# Patient Record
Sex: FEMALE | Race: WHITE | NOT HISPANIC OR LATINO | URBAN - METROPOLITAN AREA
[De-identification: names, ages, dates, MRNs, and addresses within clinical notes are randomized per-mention and may not be internally consistent; named-entity substitution may affect disease eponyms.]

---

## 2017-12-20 ENCOUNTER — EMERGENCY (EMERGENCY)
Facility: HOSPITAL | Age: 67
LOS: 1 days | Discharge: HOME | End: 2017-12-20
Attending: EMERGENCY MEDICINE | Admitting: EMERGENCY MEDICINE
Payer: MEDICARE

## 2017-12-20 VITALS
SYSTOLIC BLOOD PRESSURE: 144 MMHG | DIASTOLIC BLOOD PRESSURE: 79 MMHG | HEIGHT: 63 IN | HEART RATE: 78 BPM | OXYGEN SATURATION: 100 % | WEIGHT: 110.01 LBS | RESPIRATION RATE: 15 BRPM | TEMPERATURE: 98 F

## 2017-12-20 VITALS
RESPIRATION RATE: 14 BRPM | DIASTOLIC BLOOD PRESSURE: 74 MMHG | SYSTOLIC BLOOD PRESSURE: 136 MMHG | HEART RATE: 72 BPM | TEMPERATURE: 99 F | OXYGEN SATURATION: 99 %

## 2017-12-20 DIAGNOSIS — Z90.89 ACQUIRED ABSENCE OF OTHER ORGANS: Chronic | ICD-10-CM

## 2017-12-20 PROCEDURE — 73030 X-RAY EXAM OF SHOULDER: CPT

## 2017-12-20 PROCEDURE — 70450 CT HEAD/BRAIN W/O DYE: CPT | Mod: 26

## 2017-12-20 PROCEDURE — 72125 CT NECK SPINE W/O DYE: CPT | Mod: 26

## 2017-12-20 PROCEDURE — 71101 X-RAY EXAM UNILAT RIBS/CHEST: CPT

## 2017-12-20 PROCEDURE — 71101 X-RAY EXAM UNILAT RIBS/CHEST: CPT | Mod: 26

## 2017-12-20 PROCEDURE — 99284 EMERGENCY DEPT VISIT MOD MDM: CPT

## 2017-12-20 PROCEDURE — 99284 EMERGENCY DEPT VISIT MOD MDM: CPT | Mod: 25

## 2017-12-20 PROCEDURE — 73030 X-RAY EXAM OF SHOULDER: CPT | Mod: 26,LT

## 2017-12-20 PROCEDURE — 70450 CT HEAD/BRAIN W/O DYE: CPT

## 2017-12-20 PROCEDURE — 72125 CT NECK SPINE W/O DYE: CPT

## 2017-12-20 RX ORDER — OXYCODONE AND ACETAMINOPHEN 5; 325 MG/1; MG/1
1 TABLET ORAL ONCE
Qty: 0 | Refills: 0 | Status: DISCONTINUED | OUTPATIENT
Start: 2017-12-20 | End: 2017-12-20

## 2017-12-20 RX ADMIN — OXYCODONE AND ACETAMINOPHEN 1 TABLET(S): 5; 325 TABLET ORAL at 12:54

## 2017-12-20 RX ADMIN — OXYCODONE AND ACETAMINOPHEN 1 TABLET(S): 5; 325 TABLET ORAL at 14:26

## 2017-12-20 NOTE — ED PROVIDER NOTE - ATTENDING CONTRIBUTION TO CARE
Pt is a 68 yo female who presents to the ED with a cc of left sided pain s/p fall.  PMHx of anxiety, HTN, IBS, migraine HA.  Pt reports that she suffered a mechanical fall yesterday evening.  She was making food for her son when she slipped on some Crisco that was on the floor and landed on her left side.  Pt denies striking her head and reports no LOC.  Pt is not on blood thinners.  She did have a hard time getting off the ground but was eventually able to.  She was ambulatory after the incident.  Denies HA, blurry vision, V/D/C, abd pain.  She reports nausea, pain under her left breast with movement, left shoulder pain, and SOB.  Denies neck pain.  Pt is right hand dominant.  On exam pt anxious lying in bed.  NCAT, PERRL, EOMI, heart RRR, lungs CTA bilaterally, abd soft NT/ND.  Diffuse left lateral chest wall TTP with no acute deformity noted, Ecchymosis noted to distal left clavicle with TTP sensation intact to left UE with no TTP to humerus, elbow, forearm, wrist or hand at this time.  +radial pulse cap refill less then 2 seconds.  TMs clear no septal hematoma.  No midline C/T/L TTP, step offs or deformities noted.  Will obtain x-ray of left ribs, shoulder, and CT head/cervical spine.  Will control pain.  Agree with above plan of care.

## 2017-12-20 NOTE — ED PROVIDER NOTE - OBJECTIVE STATEMENT
66 yo female presents with accompanied to ed by her mother,  s/p slip and fall in her kitchen last night.  states was cooking and slipped on crisco that was on the floor, states she was cooking pancakes for her autistic son.   denies head injury, states she thinks she fell on her left side, has a bruise on her left shoulder, left rib pain with palpation and left neck pain.  not on blood thinners.  PMD Dr Tovar

## 2017-12-20 NOTE — ED PROVIDER NOTE - PROGRESS NOTE DETAILS
patient resting comfortably, discussed left distal clavicle fracture, states she has an orthopedic Dr Cruz.  sling applied, nvi.  copy of xrays given, ct head negative, ct cervical spine degenerative changes.  rx for percocet sent to pharmacy.  call out to ortho resident awaiting call back spoke with ortho resident, case discussed, viewed xrays, advised sling, patient can follow up with Dr Madden, call office to schedule appt time

## 2017-12-20 NOTE — ED PROVIDER NOTE - CARE PLAN
Principal Discharge DX:	Clavicle fracture Principal Discharge DX:	Clavicle fracture  Secondary Diagnosis:	Fall, initial encounter

## 2020-09-26 ENCOUNTER — EMERGENCY (EMERGENCY)
Facility: HOSPITAL | Age: 70
LOS: 1 days | Discharge: HOME | End: 2020-09-26
Attending: EMERGENCY MEDICINE | Admitting: EMERGENCY MEDICINE
Payer: MEDICARE

## 2020-09-26 VITALS
HEART RATE: 88 BPM | WEIGHT: 110.01 LBS | OXYGEN SATURATION: 96 % | HEIGHT: 63 IN | DIASTOLIC BLOOD PRESSURE: 78 MMHG | SYSTOLIC BLOOD PRESSURE: 145 MMHG | RESPIRATION RATE: 18 BRPM | TEMPERATURE: 98 F

## 2020-09-26 VITALS
DIASTOLIC BLOOD PRESSURE: 64 MMHG | TEMPERATURE: 98 F | HEART RATE: 71 BPM | OXYGEN SATURATION: 96 % | SYSTOLIC BLOOD PRESSURE: 132 MMHG | RESPIRATION RATE: 16 BRPM

## 2020-09-26 DIAGNOSIS — Z90.89 ACQUIRED ABSENCE OF OTHER ORGANS: Chronic | ICD-10-CM

## 2020-09-26 LAB
ALBUMIN SERPL ELPH-MCNC: 4 G/DL — SIGNIFICANT CHANGE UP (ref 3.3–5)
ALP SERPL-CCNC: 67 U/L — SIGNIFICANT CHANGE UP (ref 40–120)
ALT FLD-CCNC: 13 U/L — SIGNIFICANT CHANGE UP (ref 12–78)
ANION GAP SERPL CALC-SCNC: 7 MMOL/L — SIGNIFICANT CHANGE UP (ref 5–17)
AST SERPL-CCNC: 15 U/L — SIGNIFICANT CHANGE UP (ref 15–37)
BASOPHILS # BLD AUTO: 0.1 K/UL — SIGNIFICANT CHANGE UP (ref 0–0.2)
BASOPHILS # BLD AUTO: 0.16 K/UL — SIGNIFICANT CHANGE UP (ref 0–0.2)
BASOPHILS NFR BLD AUTO: 0.6 % — SIGNIFICANT CHANGE UP (ref 0–2)
BASOPHILS NFR BLD AUTO: 0.8 % — SIGNIFICANT CHANGE UP (ref 0–2)
BILIRUB SERPL-MCNC: 0.3 MG/DL — SIGNIFICANT CHANGE UP (ref 0.2–1.2)
BUN SERPL-MCNC: 15 MG/DL — SIGNIFICANT CHANGE UP (ref 7–23)
CALCIUM SERPL-MCNC: 9.1 MG/DL — SIGNIFICANT CHANGE UP (ref 8.5–10.1)
CHLORIDE SERPL-SCNC: 105 MMOL/L — SIGNIFICANT CHANGE UP (ref 96–108)
CO2 SERPL-SCNC: 29 MMOL/L — SIGNIFICANT CHANGE UP (ref 22–31)
CREAT SERPL-MCNC: 0.96 MG/DL — SIGNIFICANT CHANGE UP (ref 0.5–1.3)
EOSINOPHIL # BLD AUTO: 0.03 K/UL — SIGNIFICANT CHANGE UP (ref 0–0.5)
EOSINOPHIL # BLD AUTO: 0.1 K/UL — SIGNIFICANT CHANGE UP (ref 0–0.5)
EOSINOPHIL NFR BLD AUTO: 0.2 % — SIGNIFICANT CHANGE UP (ref 0–6)
EOSINOPHIL NFR BLD AUTO: 0.5 % — SIGNIFICANT CHANGE UP (ref 0–6)
ETHANOL SERPL-MCNC: <10 MG/DL — SIGNIFICANT CHANGE UP (ref 0–10)
GLUCOSE SERPL-MCNC: 132 MG/DL — HIGH (ref 70–99)
HCT VFR BLD CALC: 28.1 % — LOW (ref 34.5–45)
HCT VFR BLD CALC: 33 % — LOW (ref 34.5–45)
HGB BLD-MCNC: 11.3 G/DL — LOW (ref 11.5–15.5)
HGB BLD-MCNC: 9.5 G/DL — LOW (ref 11.5–15.5)
IMM GRANULOCYTES NFR BLD AUTO: 0.6 % — SIGNIFICANT CHANGE UP (ref 0–1.5)
IMM GRANULOCYTES NFR BLD AUTO: 0.6 % — SIGNIFICANT CHANGE UP (ref 0–1.5)
LYMPHOCYTES # BLD AUTO: 1.36 K/UL — SIGNIFICANT CHANGE UP (ref 1–3.3)
LYMPHOCYTES # BLD AUTO: 12.5 % — LOW (ref 13–44)
LYMPHOCYTES # BLD AUTO: 2.55 K/UL — SIGNIFICANT CHANGE UP (ref 1–3.3)
LYMPHOCYTES # BLD AUTO: 7.8 % — LOW (ref 13–44)
MAGNESIUM SERPL-MCNC: 2.4 MG/DL — SIGNIFICANT CHANGE UP (ref 1.6–2.6)
MCHC RBC-ENTMCNC: 30.9 PG — SIGNIFICANT CHANGE UP (ref 27–34)
MCHC RBC-ENTMCNC: 30.9 PG — SIGNIFICANT CHANGE UP (ref 27–34)
MCHC RBC-ENTMCNC: 33.8 GM/DL — SIGNIFICANT CHANGE UP (ref 32–36)
MCHC RBC-ENTMCNC: 34.2 GM/DL — SIGNIFICANT CHANGE UP (ref 32–36)
MCV RBC AUTO: 90.2 FL — SIGNIFICANT CHANGE UP (ref 80–100)
MCV RBC AUTO: 91.5 FL — SIGNIFICANT CHANGE UP (ref 80–100)
MONOCYTES # BLD AUTO: 1.18 K/UL — HIGH (ref 0–0.9)
MONOCYTES # BLD AUTO: 1.33 K/UL — HIGH (ref 0–0.9)
MONOCYTES NFR BLD AUTO: 5.8 % — SIGNIFICANT CHANGE UP (ref 2–14)
MONOCYTES NFR BLD AUTO: 7.6 % — SIGNIFICANT CHANGE UP (ref 2–14)
NEUTROPHILS # BLD AUTO: 14.51 K/UL — HIGH (ref 1.8–7.4)
NEUTROPHILS # BLD AUTO: 16.32 K/UL — HIGH (ref 1.8–7.4)
NEUTROPHILS NFR BLD AUTO: 79.8 % — HIGH (ref 43–77)
NEUTROPHILS NFR BLD AUTO: 83.2 % — HIGH (ref 43–77)
NRBC # BLD: 0 /100 WBCS — SIGNIFICANT CHANGE UP (ref 0–0)
NRBC # BLD: 0 /100 WBCS — SIGNIFICANT CHANGE UP (ref 0–0)
PLATELET # BLD AUTO: 271 K/UL — SIGNIFICANT CHANGE UP (ref 150–400)
PLATELET # BLD AUTO: 345 K/UL — SIGNIFICANT CHANGE UP (ref 150–400)
POTASSIUM SERPL-MCNC: 3.6 MMOL/L — SIGNIFICANT CHANGE UP (ref 3.5–5.3)
POTASSIUM SERPL-SCNC: 3.6 MMOL/L — SIGNIFICANT CHANGE UP (ref 3.5–5.3)
PROT SERPL-MCNC: 7.1 G/DL — SIGNIFICANT CHANGE UP (ref 6–8.3)
RBC # BLD: 3.07 M/UL — LOW (ref 3.8–5.2)
RBC # BLD: 3.66 M/UL — LOW (ref 3.8–5.2)
RBC # FLD: 12.8 % — SIGNIFICANT CHANGE UP (ref 10.3–14.5)
RBC # FLD: 13 % — SIGNIFICANT CHANGE UP (ref 10.3–14.5)
SODIUM SERPL-SCNC: 141 MMOL/L — SIGNIFICANT CHANGE UP (ref 135–145)
TROPONIN I SERPL-MCNC: 0.03 NG/ML — SIGNIFICANT CHANGE UP (ref 0.01–0.04)
WBC # BLD: 17.44 K/UL — HIGH (ref 3.8–10.5)
WBC # BLD: 20.44 K/UL — HIGH (ref 3.8–10.5)
WBC # FLD AUTO: 17.44 K/UL — HIGH (ref 3.8–10.5)
WBC # FLD AUTO: 20.44 K/UL — HIGH (ref 3.8–10.5)

## 2020-09-26 PROCEDURE — 70450 CT HEAD/BRAIN W/O DYE: CPT

## 2020-09-26 PROCEDURE — 93010 ELECTROCARDIOGRAM REPORT: CPT

## 2020-09-26 PROCEDURE — 82962 GLUCOSE BLOOD TEST: CPT

## 2020-09-26 PROCEDURE — 84484 ASSAY OF TROPONIN QUANT: CPT

## 2020-09-26 PROCEDURE — 73030 X-RAY EXAM OF SHOULDER: CPT | Mod: 26,RT

## 2020-09-26 PROCEDURE — 73030 X-RAY EXAM OF SHOULDER: CPT

## 2020-09-26 PROCEDURE — 99284 EMERGENCY DEPT VISIT MOD MDM: CPT | Mod: 25

## 2020-09-26 PROCEDURE — 72125 CT NECK SPINE W/O DYE: CPT

## 2020-09-26 PROCEDURE — 70450 CT HEAD/BRAIN W/O DYE: CPT | Mod: 26

## 2020-09-26 PROCEDURE — 36415 COLL VENOUS BLD VENIPUNCTURE: CPT

## 2020-09-26 PROCEDURE — 85025 COMPLETE CBC W/AUTO DIFF WBC: CPT

## 2020-09-26 PROCEDURE — 71045 X-RAY EXAM CHEST 1 VIEW: CPT | Mod: 26

## 2020-09-26 PROCEDURE — 73080 X-RAY EXAM OF ELBOW: CPT | Mod: 26,RT

## 2020-09-26 PROCEDURE — 93005 ELECTROCARDIOGRAM TRACING: CPT

## 2020-09-26 PROCEDURE — 80053 COMPREHEN METABOLIC PANEL: CPT

## 2020-09-26 PROCEDURE — 72125 CT NECK SPINE W/O DYE: CPT | Mod: 26

## 2020-09-26 PROCEDURE — 71045 X-RAY EXAM CHEST 1 VIEW: CPT

## 2020-09-26 PROCEDURE — 83735 ASSAY OF MAGNESIUM: CPT

## 2020-09-26 PROCEDURE — 73080 X-RAY EXAM OF ELBOW: CPT

## 2020-09-26 PROCEDURE — 80307 DRUG TEST PRSMV CHEM ANLYZR: CPT

## 2020-09-26 PROCEDURE — 12001 RPR S/N/AX/GEN/TRNK 2.5CM/<: CPT

## 2020-09-26 PROCEDURE — 99285 EMERGENCY DEPT VISIT HI MDM: CPT

## 2020-09-26 RX ORDER — SODIUM CHLORIDE 9 MG/ML
1000 INJECTION INTRAMUSCULAR; INTRAVENOUS; SUBCUTANEOUS ONCE
Refills: 0 | Status: DISCONTINUED | OUTPATIENT
Start: 2020-09-26 | End: 2020-09-26

## 2020-09-26 RX ORDER — OXYCODONE AND ACETAMINOPHEN 5; 325 MG/1; MG/1
2 TABLET ORAL ONCE
Refills: 0 | Status: DISCONTINUED | OUTPATIENT
Start: 2020-09-26 | End: 2020-09-26

## 2020-09-26 RX ORDER — IBUPROFEN 200 MG
600 TABLET ORAL ONCE
Refills: 0 | Status: COMPLETED | OUTPATIENT
Start: 2020-09-26 | End: 2020-09-26

## 2020-09-26 RX ORDER — LIDOCAINE 4 G/100G
1 CREAM TOPICAL ONCE
Refills: 0 | Status: COMPLETED | OUTPATIENT
Start: 2020-09-26 | End: 2020-09-26

## 2020-09-26 RX ADMIN — LIDOCAINE 1 PATCH: 4 CREAM TOPICAL at 22:30

## 2020-09-26 RX ADMIN — Medication 600 MILLIGRAM(S): at 22:30

## 2020-09-26 RX ADMIN — OXYCODONE AND ACETAMINOPHEN 2 TABLET(S): 5; 325 TABLET ORAL at 16:30

## 2020-09-26 RX ADMIN — Medication 600 MILLIGRAM(S): at 23:00

## 2020-09-26 RX ADMIN — OXYCODONE AND ACETAMINOPHEN 2 TABLET(S): 5; 325 TABLET ORAL at 17:00

## 2020-09-26 NOTE — ED PROVIDER NOTE - PROVIDER TOKENS
PROVIDER:[TOKEN:[[id number]],FOLLOWUP:[1-3 Days]] PROVIDER:[TOKEN:[[id number]],FOLLOWUP:[1-3 Days]],PROVIDER:[TOKEN:[[id number]],FOLLOWUP:[1-3 Days]]

## 2020-09-26 NOTE — ED PROVIDER NOTE - PROGRESS NOTE DETAILS
Reevaluated patient at bedside.  Patient feeling much improved.  lac repaired, wound care discussed. shoulder immobilizer applied. n/v intact. will follow up with ortho. referral provided. Discussed the results of all diagnostic testing in ED and copies of all reports given.   An opportunity to ask questions was given.  Discussed the importance of prompt, close medical follow-up.  Patient will return with any changes, concerns or persistent / worsening symptoms.  Understanding of all instructions verbalized. pt went to bathroom after set for d/c and felt dizzy, pt pulled alarm cord and tech went in bathroom and pt with breif syncope, held up on toilet by tech, no trauma. pt had preceding pain in shoulder and percoccet, but will get labs, ivf, cards consult, ekg pt cleared by cards from syncope standpoint, wbc 20k , likely due to fx, will recheck after ivf, pt feels better resting comfortably, eating and drinking. white count elevated on blood work. uncertain etiology. suspect possibly stress related. do not suspect infection. will hydrate and recheck. IVF infusing. repeat CBC pending. Dr. Dillon will assume care pt wbc 17 after ivf, presume from fx, ambulating without dizziness, ok for d/c and outpt fu

## 2020-09-26 NOTE — ED PROVIDER NOTE - CARE PLAN
Principal Discharge DX:	Closed fracture of head of right humerus, initial encounter  Secondary Diagnosis:	Forehead laceration, initial encounter  Secondary Diagnosis:	Fall, initial encounter   Principal Discharge DX:	Closed fracture of head of right humerus, initial encounter  Secondary Diagnosis:	Forehead laceration, initial encounter  Secondary Diagnosis:	Fall, initial encounter  Secondary Diagnosis:	Syncope, unspecified syncope type

## 2020-09-26 NOTE — ED ADULT NURSE REASSESSMENT NOTE - NS ED NURSE REASSESS COMMENT FT1
Pt ambulated to bathroom with assistance - when in bathroom pt pulled call bell stating she felt like she was going to pass out - pt was pale diaphoretic - sitting on commode - pt was taken back to room via wheelchair - placed in stretcher - bloods were drawn and IV started -pt feeling better laying down - cards at bedside -

## 2020-09-26 NOTE — ED PROVIDER NOTE - ATTENDING CONTRIBUTION TO CARE
Pt seen and examined and d/w PA.  agree with a and p.  pt is a 69 yo female on no blood thinners sp trip and fall landing on right shoulder and foehead,  pt denies loc, neck pain, n/v, numbness, weakness, but co 8/10 right shoulder pain and forehead laceration. pt on exam in pain, c spine nt, t and l spine t, right shoulder ttp, rom limited by pain, no palpable dislocation, chest nt, sm intact, no other bony ttp, 2+ pulses, chest and abd nt, lungs cta, forehead with abrasion/laceration requiring cleaning and eval to see if sutures required, ? tdap states, xray shoulder, ct head and neck, pain meds, if laceration, will clean and suture.  xray with humeral head fx, sling. Pt seen and examined and d/w PA.  agree with a and p.  pt is a 69 yo female on no blood thinners sp trip and fall landing on right shoulder and foehead,  pt denies loc, neck pain, n/v, numbness, weakness, but co 8/10 right shoulder pain and forehead laceration. pt on exam in pain, c spine nt, t and l spine t, right shoulder ttp, rom limited by pain, no palpable dislocation, chest nt, sm intact, no other bony ttp, 2+ pulses, chest and abd nt, lungs cta, forehead with abrasion/laceration requiring cleaning and eval to see if sutures required, ? tdap states, xray shoulder, ct head and neck, pain meds, if laceration, will clean and suture.  xray with humeral head fx, sling.  humeral head fx, sling, pain meds fu ortho

## 2020-09-26 NOTE — ED PROVIDER NOTE - PATIENT PORTAL LINK FT
You can access the FollowMyHealth Patient Portal offered by [facility] by registering at the following website: [email or web address]. By joining [employer]’s FollowMyHealth portal, you will also be able to view your health information using other applications (apps) compatible with our system. You can access the FollowMyHealth Patient Portal offered by [facility] by registering at the following website: [email or web address]. By joining [email or web address]’s FollowMyHealth portal, you will also be able to view your health information using other applications (apps) compatible with our system.

## 2020-09-26 NOTE — CONSULT NOTE ADULT - SUBJECTIVE AND OBJECTIVE BOX
History of Present Illness: The patient is a 70 year old female with HTN who presents with fall. She had a mechanical fall when visiting her mother at an [facility]. While in ED, she went to the bathroom to urinate. She had significant right shoulder pain while in the bathroom as she was trying to pull up her pants. She subsequently felt lightheaded and passed out. No palpitations, shortness of breath, chest pain. Besides shoulder pain, she currently has no complaints. She states she has had stress tests and echocardiograms in the past with no abnormalities. She only notes intermittent palpitations and tachycardia, but none today.    Past Medical/Surgical History:  HTN    Medications:  Home Medications:  Fioricet oral tablet: 2 tab(s) orally every 4 hours, As Needed (08 Mar 2016 12:32)  Imodium 2 mg oral capsule: 1 cap(s) orally every 4 hours, As Needed (08 Mar 2016 12:32)  KlonoPIN 1 mg oral tablet: 1 tab(s) orally once a day (at bedtime) (08 Mar 2016 12:32)  losartan 25 mg oral tablet: 1 tab(s) orally 2 times a day (08 Mar 2016 12:32)      Family History: Non-contributory family history of premature cardiovascular atherosclerotic disease    Social History: No tobacco, alcohol or drug use    Review of Systems:  General: No fevers, chills, weight loss or gain  Skin: No rashes, color changes  Cardiovascular: No chest pain, orthopnea  Respiratory: No shortness of breath, cough  Gastrointestinal: No nausea, abdominal pain  Genitourinary: No incontinence, pain with urination  Musculoskeletal: No pain, swelling, decreased range of motion  Neurological: No headache, weakness  Psychiatric: No depression, anxiety  Endocrine: No weight loss or gain, increased thirst  All other systems are comprehensively negative.    Physical Exam:  Vitals:        Vital Signs Last 24 Hrs  T(C): 36.7 (26 Sep 2020 15:34), Max: 36.7 (26 Sep 2020 15:34)  T(F): 98.1 (26 Sep 2020 15:34), Max: 98.1 (26 Sep 2020 15:34)  HR: 88 (26 Sep 2020 15:34) (88 - 88)  BP: 145/78 (26 Sep 2020 15:34) (145/78 - 145/78)  BP(mean): --  RR: 18 (26 Sep 2020 15:34) (18 - 18)  SpO2: 96% (26 Sep 2020 15:34) (96% - 96%)  General: NAD  HEENT: MMM  Neck: No JVD, no carotid bruit  Lungs: CTAB  CV: RRR, nl S1/S2, no M/R/G  Abdomen: S/NT/ND, +BS  Extremities: No LE edema, no cyanosis  Neuro: AAOx3, non-focal  Skin: No rash    Labs:                        11.3   20.44 )-----------( 345      ( 26 Sep 2020 19:52 )             33.0     09-26    141  |  105  |  15  ----------------------------<  132<H>  3.6   |  29  |  0.96    Ca    9.1      26 Sep 2020 19:53  Mg     2.4     09-26    TPro  7.1  /  Alb  4.0  /  TBili  0.3  /  DBili  x   /  AST  15  /  ALT  13  /  AlkPhos  x   09-26            ECG: NSR, normal axis, no ST abnormality

## 2020-09-26 NOTE — ED PROVIDER NOTE - CLINICAL SUMMARY MEDICAL DECISION MAKING FREE TEXT BOX
right shoulder pain and forehead lac after mechanical fall. with x-ray shoulder. CT head and neck. clean and repair lac. denies other injuries or complaints. n/v intact.

## 2020-09-26 NOTE — ED PROVIDER NOTE - OBJECTIVE STATEMENT
70 year old female with history of HTN, migraines, and IBS presents with right shoulder pain and forehead lac after fall PTA. was visiting mother in rehab across the street. has to visit through the window. was walking in parking lot and tripped over uneven surface. fell forward and hit forehead and right shoulder on the concrete. no LOC. does not take any blood thinners. no neck or back pain. pain in shoulder 8/10. moderate frontal HA. no dizziness, confusion, memory loss, blurred vision, or nausea. no chest or abd pain. right handed. tetanus up to date. denies other injuries or complaints.   PCP Jose Tovar

## 2020-09-26 NOTE — ED PROVIDER NOTE - SKIN, MLM
superficial 1.5 cm lac to mid aspect of forehead. no active bleeding 1.5 linear lac to mid aspect of forehead. no active bleeding

## 2020-09-26 NOTE — CONSULT NOTE ADULT - ASSESSMENT
The patient is a 70 year old female with HTN who presents with fall and syncope.    Plan:  - Syncope likely multifactorial due to vasovagal episode, pain medication, and dehydration  - ECG with no evidence of ischemia or infarction  - WBC elevated, possibly due to dehydration - should be repeated as outpatient  - Labs otherwise unremarkable  - CT head negative for acute pathology  - Patient states she had a recent echo that was normal  - No further inpatient cardiac testing indicated at this time. She can be discharged from a cardiac perspective and follow-up as outpatient.

## 2020-09-26 NOTE — ED PROVIDER NOTE - CPE EDP RESP NORM
normal...
Patient returned to ED to meet Dr. Tobar for surgery on right clavicle fracture. IV Lock ordered, will admit patient. schedule for surgery today as per Dr. Tobar. Emi ROSE

## 2020-09-26 NOTE — ED PROVIDER NOTE - NEUROLOGICAL, MLM
Alert and oriented, no focal deficits, no motor or sensory deficits. no wrist drop. strong distal pulses. symmetric eyebrow raise and smile. elevates tongue and shoulders without difficulty. normal finger to nose.

## 2020-09-26 NOTE — ED ADULT NURSE REASSESSMENT NOTE - NS ED NURSE REASSESS COMMENT FT1
Pt feeling much better after IVF- ambulated to bathroom and around the floor without difficulty - pt d/elizabeth home -verbalized understanding of d/c instruction - ambulated to waiting room in stable condition with sling in place -

## 2020-09-26 NOTE — ED PROCEDURE NOTE - CPROC ED POST PROC CARE GUIDE1
Instructed patient/caregiver to follow-up with primary care physician./Verbal/written post procedure instructions were given to patient/caregiver./Elevate the injured extremity as instructed./Instructed patient/caregiver regarding signs and symptoms of infection./Keep the cast/splint/dressing clean and dry.
Instructed patient/caregiver to follow-up with primary care physician./Keep the cast/splint/dressing clean and dry./Instructed patient/caregiver regarding signs and symptoms of infection./Verbal/written post procedure instructions were given to patient/caregiver.

## 2020-09-26 NOTE — ED PROVIDER NOTE - NSFOLLOWUPINSTRUCTIONS_ED_ALL_ED_FT
keep clean and dry 24 hours  apply thin layer of bacitracin 1-2 times a day  return to ED or follow up with primary care provider 1 week for recheck and suture removal  keep arm in shoulder immobilizer until seen by orthopedics, referral to ortho provided  tylenol as needed for pain, percocet prescribed for severe pain (use with caution) keep clean and dry 24 hours  apply thin layer of bacitracin 1-2 times a day  return to ED or follow up with primary care provider 1 week for recheck and suture removal  keep arm in shoulder immobilizer until seen by orthopedics, referral to ortho provided  tylenol as needed for pain, percocet prescribed for severe pain (use with caution)            Proximal Humerus Fracture    WHAT YOU NEED TO KNOW:    A proximal humerus fracture is a crack or break in the top of your upper arm bone. The proximal humerus is one of the bones in your shoulder joint.    Shoulder Anatomy         DISCHARGE INSTRUCTIONS:    Return to the emergency department if:   •Your pain does not get better or gets worse, even after you rest and take medicine.      •Your arm, hand, or fingers feel numb.      •The skin over your fracture is swollen, cold, or pale.      •You cannot move your arm, hand, or fingers.       Call your doctor or orthopedist if:   •You have a fever.      •Your sling gets wet, damaged, or falls off.      •You have questions or concerns about your condition or care.      Medicines: You may need any of the following:   •Prescription pain medicine may be given. Ask your healthcare provider how to take this medicine safely. Some prescription pain medicines contain acetaminophen. Do not take other medicines that contain acetaminophen without talking to your healthcare provider. Too much acetaminophen may cause liver damage. Prescription pain medicine may cause constipation. Ask your healthcare provider how to prevent or treat constipation.       •NSAIDs, such as ibuprofen, help decrease swelling, pain, and fever. This medicine is available with or without a doctor's order. NSAIDs can cause stomach bleeding or kidney problems in certain people. If you take blood thinner medicine, always ask your healthcare provider if NSAIDs are safe for you. Always read the medicine label and follow directions.      •Acetaminophen decreases pain and fever. It is available without a doctor's order. Ask how much to take and how often to take it. Follow directions. Read the labels of all other medicines you are using to see if they also contain acetaminophen, or ask your doctor or pharmacist. Acetaminophen can cause liver damage if not taken correctly. Do not use more than 4 grams (4,000 milligrams) total of acetaminophen in one day.       •Take your medicine as directed. Contact your healthcare provider if you think your medicine is not helping or if you have side effects. Tell him of her if you are allergic to any medicine. Keep a list of the medicines, vitamins, and herbs you take. Include the amounts, and when and why you take them. Bring the list or the pill bottles to follow-up visits. Carry your medicine list with you in case of an emergency.      A sling may be needed to hold your broken bones in place. It will decrease your arm movement and allow the bones to heal.    Shoulder Sling         Manage your symptoms:   •Rest your arm as much as possible. Ask your healthcare provider when you can move your arm. Also ask when you can return to sports or vigorous exercises.      •Apply ice on your arm for 15 to 20 minutes every hour or as directed. Use an ice pack, or put crushed ice in a plastic bag. Cover it with a towel before you put it on your arm. Ice helps prevent tissue damage and decreases swelling and pain.      •Go to physical therapy as directed. A physical therapist teaches you exercises to help improve movement and strength, and to decrease pain.      Follow up with your doctor or orthopedist as directed: Write down your questions so you remember to ask them during your visits.

## 2020-09-26 NOTE — ED PROVIDER NOTE - CARE PROVIDER_API CALL
Damian Morales (DO)  Orthopaedic Surgery  [address] [place] [address]  Phone: [phone]  Fax: [phone]  Follow Up Time: 1-3 Days   Damian Morales (DO)  Orthopaedic Surgery  [address] [place] [address]  Phone: [phone]  Fax: [phone]  Follow Up Time: 1-3 Days    Jose Ren  CARDIOVASCULAR DISEASE  [address] [facility] [facility] [address]  [place] [address]  Phone: [phone]  Fax: [phone]  Follow Up Time: 1-3 Days

## 2021-10-26 ENCOUNTER — TRANSCRIPTION ENCOUNTER (OUTPATIENT)
Age: 71
End: 2021-10-26

## 2021-10-26 ENCOUNTER — INPATIENT (INPATIENT)
Facility: HOSPITAL | Age: 71
LOS: 5 days | Discharge: NURSING, REHAB OR LONG-TERM CARE | DRG: 522 | End: 2021-11-01
Attending: INTERNAL MEDICINE | Admitting: INTERNAL MEDICINE
Payer: MEDICARE

## 2021-10-26 VITALS
WEIGHT: 110.01 LBS | HEART RATE: 82 BPM | SYSTOLIC BLOOD PRESSURE: 169 MMHG | DIASTOLIC BLOOD PRESSURE: 95 MMHG | RESPIRATION RATE: 16 BRPM | OXYGEN SATURATION: 90 % | TEMPERATURE: 98 F | HEIGHT: 63 IN

## 2021-10-26 DIAGNOSIS — Z90.89 ACQUIRED ABSENCE OF OTHER ORGANS: Chronic | ICD-10-CM

## 2021-10-26 LAB
ALBUMIN SERPL ELPH-MCNC: 2.8 G/DL — LOW (ref 3.3–5)
ALP SERPL-CCNC: 103 U/L — SIGNIFICANT CHANGE UP (ref 40–120)
ALT FLD-CCNC: 15 U/L — SIGNIFICANT CHANGE UP (ref 12–78)
ANION GAP SERPL CALC-SCNC: 4 MMOL/L — LOW (ref 5–17)
APTT BLD: 28.8 SEC — SIGNIFICANT CHANGE UP (ref 27.5–35.5)
AST SERPL-CCNC: 14 U/L — LOW (ref 15–37)
BASOPHILS # BLD AUTO: 0.14 K/UL — SIGNIFICANT CHANGE UP (ref 0–0.2)
BASOPHILS NFR BLD AUTO: 0.8 % — SIGNIFICANT CHANGE UP (ref 0–2)
BILIRUB SERPL-MCNC: 0.4 MG/DL — SIGNIFICANT CHANGE UP (ref 0.2–1.2)
BUN SERPL-MCNC: 17 MG/DL — SIGNIFICANT CHANGE UP (ref 7–23)
CALCIUM SERPL-MCNC: 8.9 MG/DL — SIGNIFICANT CHANGE UP (ref 8.5–10.1)
CHLORIDE SERPL-SCNC: 105 MMOL/L — SIGNIFICANT CHANGE UP (ref 96–108)
CO2 SERPL-SCNC: 32 MMOL/L — HIGH (ref 22–31)
CREAT SERPL-MCNC: 0.72 MG/DL — SIGNIFICANT CHANGE UP (ref 0.5–1.3)
EOSINOPHIL # BLD AUTO: 0.42 K/UL — SIGNIFICANT CHANGE UP (ref 0–0.5)
EOSINOPHIL NFR BLD AUTO: 2.4 % — SIGNIFICANT CHANGE UP (ref 0–6)
ETHANOL SERPL-MCNC: 15 MG/DL — HIGH (ref 0–10)
GLUCOSE SERPL-MCNC: 103 MG/DL — HIGH (ref 70–99)
HCT VFR BLD CALC: 32 % — LOW (ref 34.5–45)
HGB BLD-MCNC: 9.5 G/DL — LOW (ref 11.5–15.5)
IMM GRANULOCYTES NFR BLD AUTO: 1.5 % — SIGNIFICANT CHANGE UP (ref 0–1.5)
INR BLD: 1.17 RATIO — HIGH (ref 0.88–1.16)
LYMPHOCYTES # BLD AUTO: 15.4 % — SIGNIFICANT CHANGE UP (ref 13–44)
LYMPHOCYTES # BLD AUTO: 2.68 K/UL — SIGNIFICANT CHANGE UP (ref 1–3.3)
MCHC RBC-ENTMCNC: 26.4 PG — LOW (ref 27–34)
MCHC RBC-ENTMCNC: 29.7 GM/DL — LOW (ref 32–36)
MCV RBC AUTO: 88.9 FL — SIGNIFICANT CHANGE UP (ref 80–100)
MONOCYTES # BLD AUTO: 1.23 K/UL — HIGH (ref 0–0.9)
MONOCYTES NFR BLD AUTO: 7.1 % — SIGNIFICANT CHANGE UP (ref 2–14)
NEUTROPHILS # BLD AUTO: 12.67 K/UL — HIGH (ref 1.8–7.4)
NEUTROPHILS NFR BLD AUTO: 72.8 % — SIGNIFICANT CHANGE UP (ref 43–77)
NRBC # BLD: 0 /100 WBCS — SIGNIFICANT CHANGE UP (ref 0–0)
PLATELET # BLD AUTO: 541 K/UL — HIGH (ref 150–400)
POTASSIUM SERPL-MCNC: 4 MMOL/L — SIGNIFICANT CHANGE UP (ref 3.5–5.3)
POTASSIUM SERPL-SCNC: 4 MMOL/L — SIGNIFICANT CHANGE UP (ref 3.5–5.3)
PROT SERPL-MCNC: 6.6 G/DL — SIGNIFICANT CHANGE UP (ref 6–8.3)
PROTHROM AB SERPL-ACNC: 13.6 SEC — SIGNIFICANT CHANGE UP (ref 10.6–13.6)
RBC # BLD: 3.6 M/UL — LOW (ref 3.8–5.2)
RBC # FLD: 14.6 % — HIGH (ref 10.3–14.5)
SODIUM SERPL-SCNC: 141 MMOL/L — SIGNIFICANT CHANGE UP (ref 135–145)
TROPONIN I, HIGH SENSITIVITY RESULT: 40.7 NG/L — SIGNIFICANT CHANGE UP
WBC # BLD: 17.4 K/UL — HIGH (ref 3.8–10.5)
WBC # FLD AUTO: 17.4 K/UL — HIGH (ref 3.8–10.5)

## 2021-10-26 PROCEDURE — 93010 ELECTROCARDIOGRAM REPORT: CPT

## 2021-10-26 PROCEDURE — 73552 X-RAY EXAM OF FEMUR 2/>: CPT | Mod: 26,RT

## 2021-10-26 PROCEDURE — 73562 X-RAY EXAM OF KNEE 3: CPT | Mod: 26,RT

## 2021-10-26 PROCEDURE — 72125 CT NECK SPINE W/O DYE: CPT | Mod: 26,MA

## 2021-10-26 PROCEDURE — 73502 X-RAY EXAM HIP UNI 2-3 VIEWS: CPT | Mod: 26,RT

## 2021-10-26 PROCEDURE — 70450 CT HEAD/BRAIN W/O DYE: CPT | Mod: 26,MA

## 2021-10-26 PROCEDURE — 99285 EMERGENCY DEPT VISIT HI MDM: CPT

## 2021-10-26 RX ORDER — MORPHINE SULFATE 50 MG/1
2 CAPSULE, EXTENDED RELEASE ORAL EVERY 4 HOURS
Refills: 0 | Status: DISCONTINUED | OUTPATIENT
Start: 2021-10-26 | End: 2021-10-27

## 2021-10-26 RX ORDER — ONDANSETRON 8 MG/1
4 TABLET, FILM COATED ORAL EVERY 8 HOURS
Refills: 0 | Status: DISCONTINUED | OUTPATIENT
Start: 2021-10-26 | End: 2021-10-27

## 2021-10-26 RX ORDER — MORPHINE SULFATE 50 MG/1
2 CAPSULE, EXTENDED RELEASE ORAL ONCE
Refills: 0 | Status: DISCONTINUED | OUTPATIENT
Start: 2021-10-26 | End: 2021-10-26

## 2021-10-26 RX ORDER — LANOLIN ALCOHOL/MO/W.PET/CERES
3 CREAM (GRAM) TOPICAL AT BEDTIME
Refills: 0 | Status: DISCONTINUED | OUTPATIENT
Start: 2021-10-26 | End: 2021-10-27

## 2021-10-26 RX ORDER — ACETAMINOPHEN 500 MG
650 TABLET ORAL EVERY 6 HOURS
Refills: 0 | Status: DISCONTINUED | OUTPATIENT
Start: 2021-10-26 | End: 2021-10-27

## 2021-10-26 RX ADMIN — MORPHINE SULFATE 2 MILLIGRAM(S): 50 CAPSULE, EXTENDED RELEASE ORAL at 22:40

## 2021-10-26 RX ADMIN — MORPHINE SULFATE 2 MILLIGRAM(S): 50 CAPSULE, EXTENDED RELEASE ORAL at 22:10

## 2021-10-26 NOTE — ED PROVIDER NOTE - CARE PLAN
Principal Discharge DX:	Hip fracture, right  Secondary Diagnosis:	Syncope  Secondary Diagnosis:	Fall   1

## 2021-10-26 NOTE — ED PROVIDER NOTE - OBJECTIVE STATEMENT
72 y/o F with hx of anxiety, HTN, migraines, IBS BIBA from home for evaluation of R hip pain s/p fall last night. Pt states that she fell and hit her head while in the kitchen last night with syncopal episode and has no recollection of events. Pt c/o severe R hip/groin pain and unable to walk today. Pt given 5mg IV morphine by EMS en route to ED. Denies headache, use of blood thinners, N/V, numbness, tingling, neck/back pain, CP, SOB, abdominal pain.

## 2021-10-26 NOTE — ED PROVIDER NOTE - NSICDXPASTMEDICALHX_GEN_ALL_CORE_FT
PAST MEDICAL HISTORY:  Anxiety     Hypertension     IBS (irritable bowel syndrome)     Migraine

## 2021-10-26 NOTE — ED PROVIDER NOTE - WR ORDER STATUS 1
Alcoholic cirrhosis of liver without ascites Alcoholic cirrhosis of liver without ascites Alcoholic cirrhosis of liver without ascites Alcoholic cirrhosis of liver without ascites Alcoholic cirrhosis of liver without ascites Alcoholic cirrhosis of liver without ascites Performed

## 2021-10-26 NOTE — ED PROVIDER NOTE - CLINICAL SUMMARY MEDICAL DECISION MAKING FREE TEXT BOX
70 y/o F with hx of anxiety, HTN, migraines, IBS BIBA from home for evaluation of R hip pain s/p fall last night. Pt states that she fell and hit her head while in the kitchen last night with syncopal episode and has no recollection of events. Pt c/o severe R hip/groin pain and unable to walk today. Pt given 5mg IV morphine by EMS en route to ED. Denies headache, N/V, numbness, tingling, neck/back pain, CP, SOB, abdominal pain. PE; as above A/P: syncope, possible R hip fx; will get labs, CT head, xrays, pain meds, admit

## 2021-10-26 NOTE — ED PROVIDER NOTE - ATTENDING CONTRIBUTION TO CARE
72yo female with right hip pain s/p syncopal episode last nite, pt hit her head, no recollection of events, pt unable to walk  exam:+right hip tenderness, neurovasc intact  plan: labs, xr, ct, admit  agree with assessment and plan of PA

## 2021-10-26 NOTE — ED PROVIDER NOTE - MUSCULOSKELETAL, MLM
+ttp R hip and groin with LROM with R leg shortening, skin intact, no erythema noted, toes warm & mobile, pulses and sensation intact, R knee/tib-fib/ankle/foot NT with FROM, NVI

## 2021-10-27 ENCOUNTER — RESULT REVIEW (OUTPATIENT)
Age: 71
End: 2021-10-27

## 2021-10-27 ENCOUNTER — TRANSCRIPTION ENCOUNTER (OUTPATIENT)
Age: 71
End: 2021-10-27

## 2021-10-27 DIAGNOSIS — S72.001A FRACTURE OF UNSPECIFIED PART OF NECK OF RIGHT FEMUR, INITIAL ENCOUNTER FOR CLOSED FRACTURE: ICD-10-CM

## 2021-10-27 DIAGNOSIS — F10.20 ALCOHOL DEPENDENCE, UNCOMPLICATED: ICD-10-CM

## 2021-10-27 DIAGNOSIS — I10 ESSENTIAL (PRIMARY) HYPERTENSION: ICD-10-CM

## 2021-10-27 LAB
ALBUMIN SERPL ELPH-MCNC: 2.6 G/DL — LOW (ref 3.3–5)
ALP SERPL-CCNC: 114 U/L — SIGNIFICANT CHANGE UP (ref 40–120)
ALT FLD-CCNC: 13 U/L — SIGNIFICANT CHANGE UP (ref 12–78)
ANION GAP SERPL CALC-SCNC: 7 MMOL/L — SIGNIFICANT CHANGE UP (ref 5–17)
APPEARANCE UR: CLEAR — SIGNIFICANT CHANGE UP
APTT BLD: 30.4 SEC — SIGNIFICANT CHANGE UP (ref 27.5–35.5)
AST SERPL-CCNC: 12 U/L — LOW (ref 15–37)
BACTERIA # UR AUTO: ABNORMAL
BILIRUB DIRECT SERPL-MCNC: 0.1 MG/DL — SIGNIFICANT CHANGE UP (ref 0.05–0.2)
BILIRUB INDIRECT FLD-MCNC: 0.4 MG/DL — SIGNIFICANT CHANGE UP (ref 0.2–1)
BILIRUB SERPL-MCNC: 0.5 MG/DL — SIGNIFICANT CHANGE UP (ref 0.2–1.2)
BILIRUB UR-MCNC: NEGATIVE — SIGNIFICANT CHANGE UP
BUN SERPL-MCNC: 10 MG/DL — SIGNIFICANT CHANGE UP (ref 7–23)
BUN SERPL-MCNC: 10 MG/DL — SIGNIFICANT CHANGE UP (ref 7–23)
BUN SERPL-MCNC: 11 MG/DL — SIGNIFICANT CHANGE UP (ref 7–23)
CALCIUM SERPL-MCNC: 9 MG/DL — SIGNIFICANT CHANGE UP (ref 8.5–10.1)
CALCIUM SERPL-MCNC: 9.1 MG/DL — SIGNIFICANT CHANGE UP (ref 8.5–10.1)
CALCIUM SERPL-MCNC: 9.1 MG/DL — SIGNIFICANT CHANGE UP (ref 8.5–10.1)
CHLORIDE SERPL-SCNC: 101 MMOL/L — SIGNIFICANT CHANGE UP (ref 96–108)
CHLORIDE SERPL-SCNC: 104 MMOL/L — SIGNIFICANT CHANGE UP (ref 96–108)
CHLORIDE SERPL-SCNC: 104 MMOL/L — SIGNIFICANT CHANGE UP (ref 96–108)
CO2 SERPL-SCNC: 30 MMOL/L — SIGNIFICANT CHANGE UP (ref 22–31)
COLOR SPEC: YELLOW — SIGNIFICANT CHANGE UP
CREAT SERPL-MCNC: 0.58 MG/DL — SIGNIFICANT CHANGE UP (ref 0.5–1.3)
CREAT SERPL-MCNC: 0.6 MG/DL — SIGNIFICANT CHANGE UP (ref 0.5–1.3)
CREAT SERPL-MCNC: 0.72 MG/DL — SIGNIFICANT CHANGE UP (ref 0.5–1.3)
DIFF PNL FLD: NEGATIVE — SIGNIFICANT CHANGE UP
EPI CELLS # UR: SIGNIFICANT CHANGE UP
GLUCOSE SERPL-MCNC: 104 MG/DL — HIGH (ref 70–99)
GLUCOSE SERPL-MCNC: 105 MG/DL — HIGH (ref 70–99)
GLUCOSE SERPL-MCNC: 127 MG/DL — HIGH (ref 70–99)
GLUCOSE UR QL: NEGATIVE — SIGNIFICANT CHANGE UP
HCT VFR BLD CALC: 33 % — LOW (ref 34.5–45)
HCT VFR BLD CALC: 34.5 % — SIGNIFICANT CHANGE UP (ref 34.5–45)
HGB BLD-MCNC: 10 G/DL — LOW (ref 11.5–15.5)
HGB BLD-MCNC: 10.6 G/DL — LOW (ref 11.5–15.5)
INR BLD: 1.14 RATIO — SIGNIFICANT CHANGE UP (ref 0.88–1.16)
KETONES UR-MCNC: NEGATIVE — SIGNIFICANT CHANGE UP
LEUKOCYTE ESTERASE UR-ACNC: ABNORMAL
MAGNESIUM SERPL-MCNC: 2.3 MG/DL — SIGNIFICANT CHANGE UP (ref 1.6–2.6)
MCHC RBC-ENTMCNC: 26.2 PG — LOW (ref 27–34)
MCHC RBC-ENTMCNC: 26.8 PG — LOW (ref 27–34)
MCHC RBC-ENTMCNC: 30.3 GM/DL — LOW (ref 32–36)
MCHC RBC-ENTMCNC: 30.7 GM/DL — LOW (ref 32–36)
MCV RBC AUTO: 86.6 FL — SIGNIFICANT CHANGE UP (ref 80–100)
MCV RBC AUTO: 87.1 FL — SIGNIFICANT CHANGE UP (ref 80–100)
NITRITE UR-MCNC: NEGATIVE — SIGNIFICANT CHANGE UP
NRBC # BLD: 0 /100 WBCS — SIGNIFICANT CHANGE UP (ref 0–0)
NRBC # BLD: 0 /100 WBCS — SIGNIFICANT CHANGE UP (ref 0–0)
PH UR: 6 — SIGNIFICANT CHANGE UP (ref 5–8)
PHOSPHATE SERPL-MCNC: 3.2 MG/DL — SIGNIFICANT CHANGE UP (ref 2.5–4.5)
PLATELET # BLD AUTO: 512 K/UL — HIGH (ref 150–400)
PLATELET # BLD AUTO: 536 K/UL — HIGH (ref 150–400)
POTASSIUM SERPL-MCNC: 3.6 MMOL/L — SIGNIFICANT CHANGE UP (ref 3.5–5.3)
POTASSIUM SERPL-MCNC: 3.7 MMOL/L — SIGNIFICANT CHANGE UP (ref 3.5–5.3)
POTASSIUM SERPL-MCNC: 3.9 MMOL/L — SIGNIFICANT CHANGE UP (ref 3.5–5.3)
POTASSIUM SERPL-SCNC: 3.6 MMOL/L — SIGNIFICANT CHANGE UP (ref 3.5–5.3)
POTASSIUM SERPL-SCNC: 3.7 MMOL/L — SIGNIFICANT CHANGE UP (ref 3.5–5.3)
POTASSIUM SERPL-SCNC: 3.9 MMOL/L — SIGNIFICANT CHANGE UP (ref 3.5–5.3)
PROT SERPL-MCNC: 6.7 G/DL — SIGNIFICANT CHANGE UP (ref 6–8.3)
PROT UR-MCNC: 15
PROTHROM AB SERPL-ACNC: 13.3 SEC — SIGNIFICANT CHANGE UP (ref 10.6–13.6)
RBC # BLD: 3.81 M/UL — SIGNIFICANT CHANGE UP (ref 3.8–5.2)
RBC # BLD: 3.96 M/UL — SIGNIFICANT CHANGE UP (ref 3.8–5.2)
RBC # FLD: 14.5 % — SIGNIFICANT CHANGE UP (ref 10.3–14.5)
RBC # FLD: 14.6 % — HIGH (ref 10.3–14.5)
RBC CASTS # UR COMP ASSIST: NEGATIVE /HPF — SIGNIFICANT CHANGE UP (ref 0–4)
SARS-COV-2 RNA SPEC QL NAA+PROBE: SIGNIFICANT CHANGE UP
SODIUM SERPL-SCNC: 138 MMOL/L — SIGNIFICANT CHANGE UP (ref 135–145)
SODIUM SERPL-SCNC: 141 MMOL/L — SIGNIFICANT CHANGE UP (ref 135–145)
SODIUM SERPL-SCNC: 141 MMOL/L — SIGNIFICANT CHANGE UP (ref 135–145)
SP GR SPEC: 1.02 — SIGNIFICANT CHANGE UP (ref 1.01–1.02)
UROBILINOGEN FLD QL: NEGATIVE — SIGNIFICANT CHANGE UP
WBC # BLD: 15.72 K/UL — HIGH (ref 3.8–10.5)
WBC # BLD: 20.98 K/UL — HIGH (ref 3.8–10.5)
WBC # FLD AUTO: 15.72 K/UL — HIGH (ref 3.8–10.5)
WBC # FLD AUTO: 20.98 K/UL — HIGH (ref 3.8–10.5)
WBC UR QL: SIGNIFICANT CHANGE UP

## 2021-10-27 PROCEDURE — 71045 X-RAY EXAM CHEST 1 VIEW: CPT | Mod: 26

## 2021-10-27 PROCEDURE — 93010 ELECTROCARDIOGRAM REPORT: CPT

## 2021-10-27 PROCEDURE — 88311 DECALCIFY TISSUE: CPT | Mod: 26

## 2021-10-27 PROCEDURE — 73501 X-RAY EXAM HIP UNI 1 VIEW: CPT | Mod: 26,RT

## 2021-10-27 PROCEDURE — 88305 TISSUE EXAM BY PATHOLOGIST: CPT | Mod: 26

## 2021-10-27 RX ORDER — HYDROMORPHONE HYDROCHLORIDE 2 MG/ML
1 INJECTION INTRAMUSCULAR; INTRAVENOUS; SUBCUTANEOUS ONCE
Refills: 0 | Status: DISCONTINUED | OUTPATIENT
Start: 2021-10-27 | End: 2021-10-27

## 2021-10-27 RX ORDER — PANTOPRAZOLE SODIUM 20 MG/1
40 TABLET, DELAYED RELEASE ORAL
Refills: 0 | Status: DISCONTINUED | OUTPATIENT
Start: 2021-10-27 | End: 2021-11-01

## 2021-10-27 RX ORDER — OXYCODONE HYDROCHLORIDE 5 MG/1
10 TABLET ORAL EVERY 4 HOURS
Refills: 0 | Status: DISCONTINUED | OUTPATIENT
Start: 2021-10-27 | End: 2021-10-27

## 2021-10-27 RX ORDER — HYDROMORPHONE HYDROCHLORIDE 2 MG/ML
0.5 INJECTION INTRAMUSCULAR; INTRAVENOUS; SUBCUTANEOUS
Refills: 0 | Status: DISCONTINUED | OUTPATIENT
Start: 2021-10-27 | End: 2021-10-27

## 2021-10-27 RX ORDER — FOLIC ACID 0.8 MG
1 TABLET ORAL DAILY
Refills: 0 | Status: DISCONTINUED | OUTPATIENT
Start: 2021-10-27 | End: 2021-11-01

## 2021-10-27 RX ORDER — CLONAZEPAM 1 MG
1 TABLET ORAL AT BEDTIME
Refills: 0 | Status: DISCONTINUED | OUTPATIENT
Start: 2021-10-27 | End: 2021-10-27

## 2021-10-27 RX ORDER — ACETAMINOPHEN 500 MG
650 TABLET ORAL EVERY 6 HOURS
Refills: 0 | Status: DISCONTINUED | OUTPATIENT
Start: 2021-10-27 | End: 2021-11-01

## 2021-10-27 RX ORDER — LOSARTAN POTASSIUM 100 MG/1
25 TABLET, FILM COATED ORAL DAILY
Refills: 0 | Status: DISCONTINUED | OUTPATIENT
Start: 2021-10-27 | End: 2021-11-01

## 2021-10-27 RX ORDER — ACETAMINOPHEN 500 MG
650 TABLET ORAL EVERY 6 HOURS
Refills: 0 | Status: DISCONTINUED | OUTPATIENT
Start: 2021-10-27 | End: 2021-10-27

## 2021-10-27 RX ORDER — THIAMINE MONONITRATE (VIT B1) 100 MG
100 TABLET ORAL DAILY
Refills: 0 | Status: COMPLETED | OUTPATIENT
Start: 2021-10-27 | End: 2021-10-30

## 2021-10-27 RX ORDER — LANOLIN ALCOHOL/MO/W.PET/CERES
3 CREAM (GRAM) TOPICAL AT BEDTIME
Refills: 0 | Status: DISCONTINUED | OUTPATIENT
Start: 2021-10-27 | End: 2021-11-01

## 2021-10-27 RX ORDER — MAGNESIUM HYDROXIDE 400 MG/1
30 TABLET, CHEWABLE ORAL DAILY
Refills: 0 | Status: DISCONTINUED | OUTPATIENT
Start: 2021-10-27 | End: 2021-11-01

## 2021-10-27 RX ORDER — SODIUM CHLORIDE 9 MG/ML
1000 INJECTION, SOLUTION INTRAVENOUS
Refills: 0 | Status: DISCONTINUED | OUTPATIENT
Start: 2021-10-27 | End: 2021-10-27

## 2021-10-27 RX ORDER — OXYCODONE HYDROCHLORIDE 5 MG/1
5 TABLET ORAL EVERY 4 HOURS
Refills: 0 | Status: DISCONTINUED | OUTPATIENT
Start: 2021-10-27 | End: 2021-10-27

## 2021-10-27 RX ORDER — FOLIC ACID 0.8 MG
1 TABLET ORAL DAILY
Refills: 0 | Status: DISCONTINUED | OUTPATIENT
Start: 2021-10-27 | End: 2021-10-27

## 2021-10-27 RX ORDER — LOSARTAN POTASSIUM 100 MG/1
25 TABLET, FILM COATED ORAL DAILY
Refills: 0 | Status: DISCONTINUED | OUTPATIENT
Start: 2021-10-27 | End: 2021-10-27

## 2021-10-27 RX ORDER — SENNA PLUS 8.6 MG/1
2 TABLET ORAL AT BEDTIME
Refills: 0 | Status: DISCONTINUED | OUTPATIENT
Start: 2021-10-27 | End: 2021-11-01

## 2021-10-27 RX ORDER — CHLORHEXIDINE GLUCONATE 213 G/1000ML
1 SOLUTION TOPICAL
Refills: 0 | Status: DISCONTINUED | OUTPATIENT
Start: 2021-10-27 | End: 2021-10-27

## 2021-10-27 RX ORDER — ONDANSETRON 8 MG/1
4 TABLET, FILM COATED ORAL EVERY 6 HOURS
Refills: 0 | Status: DISCONTINUED | OUTPATIENT
Start: 2021-10-27 | End: 2021-11-01

## 2021-10-27 RX ORDER — OXYCODONE HYDROCHLORIDE 5 MG/1
5 TABLET ORAL EVERY 4 HOURS
Refills: 0 | Status: DISCONTINUED | OUTPATIENT
Start: 2021-10-27 | End: 2021-11-01

## 2021-10-27 RX ORDER — OXYCODONE HYDROCHLORIDE 5 MG/1
10 TABLET ORAL EVERY 4 HOURS
Refills: 0 | Status: DISCONTINUED | OUTPATIENT
Start: 2021-10-27 | End: 2021-11-01

## 2021-10-27 RX ORDER — POLYETHYLENE GLYCOL 3350 17 G/17G
17 POWDER, FOR SOLUTION ORAL AT BEDTIME
Refills: 0 | Status: DISCONTINUED | OUTPATIENT
Start: 2021-10-27 | End: 2021-11-01

## 2021-10-27 RX ORDER — ONDANSETRON 8 MG/1
4 TABLET, FILM COATED ORAL ONCE
Refills: 0 | Status: DISCONTINUED | OUTPATIENT
Start: 2021-10-27 | End: 2021-10-27

## 2021-10-27 RX ORDER — OXYCODONE HYDROCHLORIDE 5 MG/1
5 TABLET ORAL ONCE
Refills: 0 | Status: DISCONTINUED | OUTPATIENT
Start: 2021-10-27 | End: 2021-10-27

## 2021-10-27 RX ORDER — CEFAZOLIN SODIUM 1 G
2000 VIAL (EA) INJECTION ONCE
Refills: 0 | Status: DISCONTINUED | OUTPATIENT
Start: 2021-10-27 | End: 2021-10-27

## 2021-10-27 RX ORDER — CLONAZEPAM 1 MG
1 TABLET ORAL AT BEDTIME
Refills: 0 | Status: DISCONTINUED | OUTPATIENT
Start: 2021-10-27 | End: 2021-11-01

## 2021-10-27 RX ORDER — ALENDRONATE SODIUM 70 MG/1
1 TABLET ORAL
Qty: 0 | Refills: 0 | DISCHARGE

## 2021-10-27 RX ORDER — ENOXAPARIN SODIUM 100 MG/ML
40 INJECTION SUBCUTANEOUS DAILY
Refills: 0 | Status: DISCONTINUED | OUTPATIENT
Start: 2021-10-28 | End: 2021-11-01

## 2021-10-27 RX ORDER — CEFAZOLIN SODIUM 1 G
2000 VIAL (EA) INJECTION EVERY 8 HOURS
Refills: 0 | Status: COMPLETED | OUTPATIENT
Start: 2021-10-27 | End: 2021-10-28

## 2021-10-27 RX ADMIN — HYDROMORPHONE HYDROCHLORIDE 0.5 MILLIGRAM(S): 2 INJECTION INTRAMUSCULAR; INTRAVENOUS; SUBCUTANEOUS at 14:27

## 2021-10-27 RX ADMIN — HYDROMORPHONE HYDROCHLORIDE 0.5 MILLIGRAM(S): 2 INJECTION INTRAMUSCULAR; INTRAVENOUS; SUBCUTANEOUS at 08:32

## 2021-10-27 RX ADMIN — SODIUM CHLORIDE 75 MILLILITER(S): 9 INJECTION, SOLUTION INTRAVENOUS at 21:12

## 2021-10-27 RX ADMIN — HYDROMORPHONE HYDROCHLORIDE 1 MILLIGRAM(S): 2 INJECTION INTRAMUSCULAR; INTRAVENOUS; SUBCUTANEOUS at 02:24

## 2021-10-27 RX ADMIN — MORPHINE SULFATE 2 MILLIGRAM(S): 50 CAPSULE, EXTENDED RELEASE ORAL at 01:01

## 2021-10-27 RX ADMIN — CHLORHEXIDINE GLUCONATE 1 APPLICATION(S): 213 SOLUTION TOPICAL at 14:31

## 2021-10-27 RX ADMIN — HYDROMORPHONE HYDROCHLORIDE 1 MILLIGRAM(S): 2 INJECTION INTRAMUSCULAR; INTRAVENOUS; SUBCUTANEOUS at 02:09

## 2021-10-27 RX ADMIN — HYDROMORPHONE HYDROCHLORIDE 0.5 MILLIGRAM(S): 2 INJECTION INTRAMUSCULAR; INTRAVENOUS; SUBCUTANEOUS at 08:49

## 2021-10-27 NOTE — DISCHARGE NOTE PROVIDER - NSDCMRMEDTOKEN_GEN_ALL_CORE_FT
Fioricet oral tablet: 1 tab(s) orally every 6 hours, As Needed  hydrocodone-acetaminophen 7.5 mg-325 mg oral tablet: 1 tab(s) orally every 8 hours  KlonoPIN 1 mg oral tablet: 1 tab(s) orally once a day (at bedtime)  losartan 25 mg oral tablet: 1 tab(s) orally 2 times a day  Probiotic Formula oral capsule: 1 cap(s) orally once a day   acetaminophen 325 mg oral tablet: 2 tab(s) orally every 6 hours, As needed, Temp greater or equal to 38C (100.4F), Mild Pain (1 - 3)  bisacodyl 10 mg rectal suppository: 1 suppository(ies) rectal once, As needed, Constipation  calcium-vitamin D 500 mg-5 mcg (200 intl units) oral tablet: 1 tab(s) orally 3 times a day  enoxaparin: 40 milligram(s) subcutaneous once a day  KlonoPIN 1 mg oral tablet: 1 tab(s) orally once a day (at bedtime)  losartan 25 mg oral tablet: 1 tab(s) orally 2 times a day  Multiple Vitamins oral tablet: 1 tab(s) orally once a day  ocular lubricant ophthalmic solution: 1 drop(s) to each affected eye 4 times a day, As needed, Dry Eyes  oxyCODONE 10 mg oral tablet: 1 tab(s) orally every 4 hours, As needed, Severe Pain (7 - 10)  oxyCODONE 5 mg oral tablet: 1 tab(s) orally every 4 hours, As needed, Moderate Pain (4 - 6)  polyethylene glycol 3350 oral powder for reconstitution: 17 gram(s) orally once a day (at bedtime)  Probiotic Formula oral capsule: 1 cap(s) orally once a day  senna oral tablet: 2 tab(s) orally once a day (at bedtime)

## 2021-10-27 NOTE — H&P ADULT - PROBLEM SELECTOR PLAN 1
Admit  Bed rest  Pain meds prn  MAY PROCEED TO O.R. FOR PLANNED SURGERY  Cardio clearance noted  Ortho consult  Further work-up/management pending clinical course.

## 2021-10-27 NOTE — DISCHARGE NOTE PROVIDER - CARE PROVIDER_API CALL
Osmar Victor)  Orthopaedic Surgery Surgery  [address] [place] [address]  Phone: [phone]  Fax: [phone]  Follow Up Time:

## 2021-10-27 NOTE — DISCHARGE NOTE PROVIDER - NSDCHOSPICE_GEN_A_CORE
Bedside and verbal shift change report recieved from [facility]  (offgoing nurse) given to Charla Espinoza RN (oncoming nurse). Report included the following information SBAR, Kardex, Intake/Output, MAR and Recent Results     0845- Patient was more calm and alert before brother vistied this morning. Patient became somewhat lethargic, and unable to keep head up as well as eyes open long enough to answer simple questions. Patient speech was slurred at times. Patient also was using obscene language towards staff regarding ordering breakfast. Tele monitor placed to assist with monitoring patient heart rate due to increased lethargic. Will cont to monitor. 1245- Patient sleeping at this time, unaware that MD rounding. Patient snoring aloud, very lethargic when awaken. Resp even and non-labored. No s/s resp distress noted. Patient continues to be monitored via remote tele. 6758- Patient awake, alert and states he feels well rested. Resp ween and non-labored. No ss/ resp distress noted. No c/o voiced. 1715- Patient resting abed quietly at this time. Patient request straight cath, performed independently at bedside. No c/o voiced. Resp even and non-labored. No s/s resp distress noted. Bedside and verbal shift change report given to PRASAD Moraes (oncoming nurse) by Florinda Gr RN (offgoing nurse).  Report included the following information SBAR, Kardex, Intake/Output, MAR and Recent Results No

## 2021-10-27 NOTE — CONSULT NOTE ADULT - ASSESSMENT
The patient is a 71 year old female with a history of HTN who is admitted with a hip fracture.    Plan:  - ECG with no evidence of ischemia or infarction  - As per patient, normal echo and stress test within the last two years  - Continue losartan 25 mg daily  - Elevated BPs likely due to pain  - There are no active cardiac issues. The patient is at low risk for cardiac events for an intermediate risk surgery. She is optimized to proceed from a cardiac standpoint.
71 year old female with a history of HTN who is admitted with a hip fracture.    right hip fx  OP  OA  Fall  ? etoh use disorder - pt vehemently denies alcohol use - unable to reach family -   vs note d- labs reviewed - imaging reviewed - ortho eval noted    will add ciwa scale -   will att Benzo PRN for ciwa trigger  alcohol use disorder is under investigation / work up as pt is flat out denying drinking    cardio eval noted  medical optimization  on tele monitor

## 2021-10-27 NOTE — DISCHARGE NOTE PROVIDER - NSDCCPTREATMENT_GEN_ALL_CORE_FT
PRINCIPAL PROCEDURE  Procedure: Hemiarthroplasty of right hip  Findings and Treatment: Discharge Instructions Total Hip Arthroplasty  1. Diet: Resume previous diet  2. Activity: WBAT. Rolling walker. Posterior Hip Dislocation Precautions. Abduction Pillow while in bed/chair. Daily Physical Therapy.  3. Call with: fever over 100.4, wound redness, drainage or open area, calf pain/calf swelling.  4. Wound Care: Remove old and Place new Aquacel bandage to hip wound in 7days. No bandage needed after staple removal.  5. Remove Staples Post Op Day #14 (11/10) so long as wound is healed, no drainage or open area. Additionally PLEASE CHANGE BANDAGE AS NEEDED also in the event of leakage or saturation.  6. OK to Shower with Aquacel. Must be an Aquacel. Avoid direct water beating on bandage.   7. DVT PE Prophylaxis: Lovenox 40 for 35 Days See Med Rec.  8.   Follow Up: Dr. Segovia in 14 days. Please call the office to schedule.    9. Medication: eRX sent to your pharmacy for  if you go home, otherwise the facility will dispense

## 2021-10-27 NOTE — CONSULT NOTE ADULT - SUBJECTIVE AND OBJECTIVE BOX
71y Female presents s/p [facility] fall c/o severe right hip pain and inability to ambulate.  Patient denies headstrike or LOC. Patient denies radiation of pain. Patient denies numbness/tingling/burning in the RLE. No other bone/joint complaints. Patient is a community ambulator at baseline with/without assistive devices. Pt concerned about leaving her 30 yo autistic son home without care. Pt is a retired nurse. C/O 10/10 pain.     PAST MEDICAL & SURGICAL HISTORY:  Hypertension  Anxiety  IBS (irritable bowel syndrome)  Migraine  S/P tonsillectomy      MEDICATIONS  (STANDING):  chlorhexidine 4% Liquid 1 Application(s) Topical two times a day    MEDICATIONS  (PRN):  acetaminophen     Tablet .. 650 milliGRAM(s) Oral every 6 hours PRN Temp greater or equal to 38C (100.4F), Mild Pain (1 - 3), Moderate Pain (4 - 6), Severe Pain (7 - 10)  aluminum hydroxide/magnesium hydroxide/simethicone Suspension 30 milliLiter(s) Oral every 4 hours PRN Dyspepsia  HYDROmorphone  Injectable 0.5 milliGRAM(s) IV Push every 3 hours PRN breakthrough pain  melatonin 3 milliGRAM(s) Oral at bedtime PRN Insomnia  ondansetron Injectable 4 milliGRAM(s) IV Push every 8 hours PRN Nausea and/or Vomiting  oxyCODONE    IR 5 milliGRAM(s) Oral every 4 hours PRN pain 0-5  oxyCODONE    IR 10 milliGRAM(s) Oral every 4 hours PRN pain 6-10    Allergies    No Known Allergies    Intolerances    ibuprofen (Stomach Upset)                            9.5    17.40 )-----------( 541      ( 26 Oct 2021 22:40 )             32.0     10-26    141  |  105  |  17  ----------------------------<  103<H>  4.0   |  32<H>  |  0.72    Ca    8.9      26 Oct 2021 22:40    TPro  6.6  /  Alb  2.8<L>  /  TBili  0.4  /  DBili  x   /  AST  14<L>  /  ALT  15  /  AlkPhos  103  10-26    PT/INR - ( 26 Oct 2021 22:40 )   PT: 13.6 sec;   INR: 1.17 ratio         PTT - ( 26 Oct 2021 22:40 )  PTT:28.8 sec    T(C): 37.2 (10-27-21 @ 00:59), Max: 37.2 (10-27-21 @ 00:59)  HR: 80 (10-27-21 @ 00:59) (80 - 82)  BP: 163/78 (10-27-21 @ 00:59) (163/78 - 169/95)  RR: 18 (10-27-21 @ 00:59) (16 - 18)  SpO2: 96% (10-27-21 @ 00:59) (90% - 96%)  Wt(kg): --    PE   RLE: Leg shortened, externally rotated.   Skin intact; No ecchymosis/soft tissue swelling  Compartments soft; + TTP about hip. No TTP to knee/leg/ankle/foot   ROM limited 2/2 pain   Unable to SLR; + Log Roll/Heel Strike  Motor intact GS/TA/FHL/EHL  SILT L2-S1  DP/PT pulses 2+    :LE/BUE:   No bony TTP; Good ROM w/o pain; Exam Unremarkable    Imaging:  XR demonstrating right femoraal neckfracture    71y Female with femoral neck fracture  - Pain control - dilaudid 1mg X 1 dose stat. Oxy 5/10 PRN.   - NPO/IVF  - Lewis catheter  - CBC/BMP/Coags/UA/T+S x2  - EKG/CXR  - Medical clearance pending   - Plan for OR pending medical clearance  -Hospital course, surgery, discharge discussed and explained to patient. Pt very distressed about her present situation - feels overwhelmed. Ortho attending to see pt in am and review surgical plan and course with pt.

## 2021-10-27 NOTE — PATIENT PROFILE ADULT - CONTRAINDICATIONS & PRECAUTIONS (SELECT ALL THAT APPLY)
A severe allergic reaction (e.g. anaphylaxis) to any component of the applicable vaccine being administered

## 2021-10-27 NOTE — DISCHARGE NOTE PROVIDER - HOSPITAL COURSE
72 y/o F with hx of anxiety, HTN, migraines, IBS BIBA from home for evaluation of R hip pain s/p fall last night. Pt states that she fell and hit her head while in the kitchen last night with syncopal episode and has no recollection of events. Pt c/o severe R hip/groin pain and unable to walk today. Pt given 5mg IV morphine by EMS en route to ED. Denies headache, use of blood thinners, N/V, numbness, tingling, neck/back pain, CP, SOB, abdominal pain.    S/P right hip hemiarthoplasty  Going to [facility]    >35 minutes spent on discharge

## 2021-10-27 NOTE — DISCHARGE NOTE PROVIDER - NSDCCPCAREPLAN_GEN_ALL_CORE_FT
PRINCIPAL DISCHARGE DIAGNOSIS  Diagnosis: Hip fracture, right  Assessment and Plan of Treatment: Femoral Neck Fx      SECONDARY DISCHARGE DIAGNOSES  Diagnosis: Syncope  Assessment and Plan of Treatment:     Diagnosis: Fall  Assessment and Plan of Treatment:

## 2021-10-27 NOTE — CONSULT NOTE ADULT - SUBJECTIVE AND OBJECTIVE BOX
History of Present Illness: The patient is a 71 year old female with a history of HTN who presents with a fall. She was found to have a hip fracture and plan is for surgery. She denies chest pain, shortness of breath, dizziness, palpitations. She was seen by me in 9/20 ED for syncope. She has had an echo and stress test over the past 2 years with Dr. Reid that was normal.    Past Medical/Surgical History:  HTN    Medications:  Home Medications:  Fioricet oral tablet: 2 tab(s) orally every 4 hours, As Needed (08 Mar 2016 12:32)  Imodium 2 mg oral capsule: 1 cap(s) orally every 4 hours, As Needed (08 Mar 2016 12:32)  KlonoPIN 1 mg oral tablet: 1 tab(s) orally once a day (at bedtime) (08 Mar 2016 12:32)  losartan 25 mg oral tablet: 1 tab(s) orally 2 times a day (08 Mar 2016 12:32)      Family History: Non-contributory family history of premature cardiovascular atherosclerotic disease    Social History: No tobacco, alcohol or drug use    Review of Systems:  General: No fevers, chills, weight loss or gain  Skin: No rashes, color changes  Cardiovascular: No chest pain, orthopnea  Respiratory: No shortness of breath, cough  Gastrointestinal: No nausea, abdominal pain  Genitourinary: No incontinence, pain with urination  Musculoskeletal: No pain, swelling, decreased range of motion  Neurological: No headache, weakness  Psychiatric: No depression, anxiety  Endocrine: No weight loss or gain, increased thirst  All other systems are comprehensively negative.    Physical Exam:  Vitals:        Vital Signs Last 24 Hrs  T(C): 36.6 (27 Oct 2021 05:03), Max: 37.2 (27 Oct 2021 00:59)  T(F): 97.9 (27 Oct 2021 05:03), Max: 99 (27 Oct 2021 00:59)  HR: 76 (27 Oct 2021 05:03) (65 - 82)  BP: 170/77 (27 Oct 2021 05:03) (139/74 - 170/77)  BP(mean): --  RR: 16 (27 Oct 2021 05:03) (16 - 18)  SpO2: 92% (27 Oct 2021 05:03) (90% - 99%)  General: NAD  HEENT: MMM  Neck: No JVD, no carotid bruit  Lungs: CTAB  CV: RRR, nl S1/S2, no M/R/G  Abdomen: S/NT/ND, +BS  Extremities: No LE edema, no cyanosis  Neuro: AAOx3, non-focal  Skin: No rash    Labs:                        10.0   15.72 )-----------( 536      ( 27 Oct 2021 09:30 )             33.0     10-27    141  |  104  |  11  ----------------------------<  104<H>  3.7   |  30  |  0.60    Ca    9.1      27 Oct 2021 09:30    TPro  6.6  /  Alb  2.8<L>  /  TBili  0.4  /  DBili  x   /  AST  14<L>  /  ALT  15  /  AlkPhos  103  10-26        PT/INR - ( 27 Oct 2021 09:30 )   PT: 13.3 sec;   INR: 1.14 ratio         PTT - ( 27 Oct 2021 09:30 )  PTT:30.4 sec    ECG: NSR, normal axis, no ST abnormality

## 2021-10-27 NOTE — CONSULT NOTE ADULT - SUBJECTIVE AND OBJECTIVE BOX
Date/Time Patient Seen:  		  Referring MD:   Data Reviewed	       Patient is a 71y old  Female who presents with a chief complaint of     Subjective/HPI   · Chief Complaint: The patient is a 71y Female complaining of pain, thigh.  · HPI Objective Statement: 72 y/o F with hx of anxiety, HTN, migraines, IBS BIBA from home for evaluation of R hip pain s/p fall last night. Pt states that she fell and hit her head while in the kitchen last night with syncopal episode and has no recollection of events. Pt c/o severe R hip/groin pain and unable to walk today. Pt given 5mg IV morphine by EMS en route to ED. Denies headache, use of blood thinners, N/V, numbness, tingling, neck/back pain, CP, SOB, abdominal pain.  · Presenting Symptoms: DIFFICULTY BEARING WEIGHT, PAIN, R hip pain, fall, syncope, head trauma  · Negative Findings: no abrasion, no back pain, no fever, no numbness, no tingling  · Location: hip   · Laterality: right   · Radiation: no radiation   · Location: R hip  · Timing: constant, worsening  · Duration: yesterday   · Quality: sharp  · Context: fall  · Incident Location: home  · Aggravated Factors: movement, walking  · Relieving Factors: none    PAST MEDICAL & SURGICAL HISTORY:  Hypertension    Anxiety    IBS (irritable bowel syndrome)    Migraine    S/P tonsillectomy          Medication list         MEDICATIONS  (STANDING):  chlorhexidine 4% Liquid 1 Application(s) Topical two times a day  lactated ringers. 1000 milliLiter(s) (75 mL/Hr) IV Continuous <Continuous>    MEDICATIONS  (PRN):  acetaminophen     Tablet .. 650 milliGRAM(s) Oral every 6 hours PRN Temp greater or equal to 38C (100.4F), Mild Pain (1 - 3), Moderate Pain (4 - 6), Severe Pain (7 - 10)  aluminum hydroxide/magnesium hydroxide/simethicone Suspension 30 milliLiter(s) Oral every 4 hours PRN Dyspepsia  HYDROmorphone  Injectable 0.5 milliGRAM(s) IV Push every 3 hours PRN breakthrough pain  melatonin 3 milliGRAM(s) Oral at bedtime PRN Insomnia  ondansetron Injectable 4 milliGRAM(s) IV Push every 8 hours PRN Nausea and/or Vomiting  oxyCODONE    IR 5 milliGRAM(s) Oral every 4 hours PRN pain 0-5  oxyCODONE    IR 10 milliGRAM(s) Oral every 4 hours PRN pain 6-10         Vitals log        ICU Vital Signs Last 24 Hrs  T(C): 36.6 (27 Oct 2021 05:03), Max: 37.2 (27 Oct 2021 00:59)  T(F): 97.9 (27 Oct 2021 05:03), Max: 99 (27 Oct 2021 00:59)  HR: 76 (27 Oct 2021 05:03) (65 - 82)  BP: 170/77 (27 Oct 2021 05:03) (139/74 - 170/77)  BP(mean): --  ABP: --  ABP(mean): --  RR: 16 (27 Oct 2021 05:03) (16 - 18)  SpO2: 92% (27 Oct 2021 05:03) (90% - 99%)           Input and Output:  I&O's Detail      Lab Data                        9.5    17.40 )-----------( 541      ( 26 Oct 2021 22:40 )             32.0     10-26    141  |  105  |  17  ----------------------------<  103<H>  4.0   |  32<H>  |  0.72    Ca    8.9      26 Oct 2021 22:40    TPro  6.6  /  Alb  2.8<L>  /  TBili  0.4  /  DBili  x   /  AST  14<L>  /  ALT  15  /  AlkPhos  103  10-26            Review of Systems	      Objective     Physical Examination        Pertinent Lab findings & Imaging      Lewis:  NO   Adequate UO     I&O's Detail           Discussed with:     Cultures:	        Radiology    EXAM:  CT CERVICAL SPINE                          EXAM:  CT BRAIN                            PROCEDURE DATE:  10/26/2021          INTERPRETATION:  CLINICAL INFORMATION: Trauma. Fall.    TECHNIQUE:  Axial CT images were acquired through the head and cervical spine.  Intravenous contrast: None  Two-dimensional reformats were generated.    COMPARISON STUDY: 9/26/2020    FINDINGS:  CT head:    There is no CT evidence of acute intracranial hemorrhage, mass effect, midline shift, or acute territorial infarct.    The ventricles and sulci are normal in size and configuration. The basal cisterns are patent. There are periventricular white matter hypodensities that are nonspecific in nature but may reflect chronic ischemic microvascular disease.      Polypoid mucosal thickening of the maxillary sinuses.    The mastoid air cells and middle ear cavities are grossly clear.    There is no depressed calvarial fracture.      CT cervical spine:    Diagnostic accuracy is limited secondary to patient motion.    There is preservation of the cervical lordosis.  There is no CT evidence of an acute cervical spine fracture or traumatic malalignment.  There is no suspicious lytic or blastic lesion.  The paraspinous soft tissues are unremarkable within limits of CT scan.    Degenerative changes:  There are multilevel degenerative changes characterized by disc osteophyte complexes and facet and uncinate hypertrophy with resultant mild multilevel central canal and neural foraminal stenosis.    Incidental findings:  1.5 cm hypodense lesion in the right thyroid lobe which can be better evaluated with ultrasound.  Visualized lung apices are unremarkable.      IMPRESSION:    CT BRAIN:    No evidence of acute intracranial hemorrhage, midline shift or CT evidence of acute territorial infarct.    If the patient's symptoms persist, consider short interval follow-up head CT or brain MRI if there are no MRI contraindications.      CT CERVICAL SPINE:    No acute cervical fracture or traumatic malalignment.    MRI would be required to evaluate the ligamentous structures at higher sensitivity as well as for better evaluation of the cervical canal and its contents.        --- End of Report ---            SUNIL CALERO MD; Attending Radiologist  This document has been electronically signed. Oct 27 2021 12:12AM                           Date/Time Patient Seen:  		  Referring MD:   Data Reviewed	       Patient is a 71y old  Female who presents with a chief complaint of     Subjective/HPI  in bed  seen and examined  vs noted  labs reviewed  imaging reviewed       · Chief Complaint: The patient is a 71y Female complaining of pain, thigh.  · HPI Objective Statement: 72 y/o F with hx of anxiety, HTN, migraines, IBS BIBA from home for evaluation of R hip pain s/p fall last night. Pt states that she fell and hit her head while in the kitchen last night with syncopal episode and has no recollection of events. Pt c/o severe R hip/groin pain and unable to walk today. Pt given 5mg IV morphine by EMS en route to ED. Denies headache, use of blood thinners, N/V, numbness, tingling, neck/back pain, CP, SOB, abdominal pain.  · Presenting Symptoms: DIFFICULTY BEARING WEIGHT, PAIN, R hip pain, fall, syncope, head trauma  · Negative Findings: no abrasion, no back pain, no fever, no numbness, no tingling  · Location: hip   · Laterality: right   · Radiation: no radiation   · Location: R hip  · Timing: constant, worsening  · Duration: yesterday   · Quality: sharp  · Context: fall  · Incident Location: home  · Aggravated Factors: movement, walking  · Relieving Factors: none    PAST MEDICAL & SURGICAL HISTORY:  Hypertension    Anxiety    IBS (irritable bowel syndrome)    Migraine    S/P tonsillectomy          Medication list         MEDICATIONS  (STANDING):  chlorhexidine 4% Liquid 1 Application(s) Topical two times a day  lactated ringers. 1000 milliLiter(s) (75 mL/Hr) IV Continuous <Continuous>    MEDICATIONS  (PRN):  acetaminophen     Tablet .. 650 milliGRAM(s) Oral every 6 hours PRN Temp greater or equal to 38C (100.4F), Mild Pain (1 - 3), Moderate Pain (4 - 6), Severe Pain (7 - 10)  aluminum hydroxide/magnesium hydroxide/simethicone Suspension 30 milliLiter(s) Oral every 4 hours PRN Dyspepsia  HYDROmorphone  Injectable 0.5 milliGRAM(s) IV Push every 3 hours PRN breakthrough pain  melatonin 3 milliGRAM(s) Oral at bedtime PRN Insomnia  ondansetron Injectable 4 milliGRAM(s) IV Push every 8 hours PRN Nausea and/or Vomiting  oxyCODONE    IR 5 milliGRAM(s) Oral every 4 hours PRN pain 0-5  oxyCODONE    IR 10 milliGRAM(s) Oral every 4 hours PRN pain 6-10         Vitals log        ICU Vital Signs Last 24 Hrs  T(C): 36.6 (27 Oct 2021 05:03), Max: 37.2 (27 Oct 2021 00:59)  T(F): 97.9 (27 Oct 2021 05:03), Max: 99 (27 Oct 2021 00:59)  HR: 76 (27 Oct 2021 05:03) (65 - 82)  BP: 170/77 (27 Oct 2021 05:03) (139/74 - 170/77)  BP(mean): --  ABP: --  ABP(mean): --  RR: 16 (27 Oct 2021 05:03) (16 - 18)  SpO2: 92% (27 Oct 2021 05:03) (90% - 99%)           Input and Output:  I&O's Detail      Lab Data                        9.5    17.40 )-----------( 541      ( 26 Oct 2021 22:40 )             32.0     10-26    141  |  105  |  17  ----------------------------<  103<H>  4.0   |  32<H>  |  0.72    Ca    8.9      26 Oct 2021 22:40    TPro  6.6  /  Alb  2.8<L>  /  TBili  0.4  /  DBili  x   /  AST  14<L>  /  ALT  15  /  AlkPhos  103  10-26            Review of Systems	  fall  pain  anxious      Objective     Physical Examination    heart s1s2  lung dec BS  abd soft  head - ecchymosis  verbal  alert  anxious  depressed      Pertinent Lab findings & Imaging      Lewis:  NO   Adequate UO     I&O's Detail           Discussed with:     Cultures:	        Radiology    EXAM:  CT CERVICAL SPINE                          EXAM:  CT BRAIN                            PROCEDURE DATE:  10/26/2021          INTERPRETATION:  CLINICAL INFORMATION: Trauma. Fall.    TECHNIQUE:  Axial CT images were acquired through the head and cervical spine.  Intravenous contrast: None  Two-dimensional reformats were generated.    COMPARISON STUDY: 9/26/2020    FINDINGS:  CT head:    There is no CT evidence of acute intracranial hemorrhage, mass effect, midline shift, or acute territorial infarct.    The ventricles and sulci are normal in size and configuration. The basal cisterns are patent. There are periventricular white matter hypodensities that are nonspecific in nature but may reflect chronic ischemic microvascular disease.      Polypoid mucosal thickening of the maxillary sinuses.    The mastoid air cells and middle ear cavities are grossly clear.    There is no depressed calvarial fracture.      CT cervical spine:    Diagnostic accuracy is limited secondary to patient motion.    There is preservation of the cervical lordosis.  There is no CT evidence of an acute cervical spine fracture or traumatic malalignment.  There is no suspicious lytic or blastic lesion.  The paraspinous soft tissues are unremarkable within limits of CT scan.    Degenerative changes:  There are multilevel degenerative changes characterized by disc osteophyte complexes and facet and uncinate hypertrophy with resultant mild multilevel central canal and neural foraminal stenosis.    Incidental findings:  1.5 cm hypodense lesion in the right thyroid lobe which can be better evaluated with ultrasound.  Visualized lung apices are unremarkable.      IMPRESSION:    CT BRAIN:    No evidence of acute intracranial hemorrhage, midline shift or CT evidence of acute territorial infarct.    If the patient's symptoms persist, consider short interval follow-up head CT or brain MRI if there are no MRI contraindications.      CT CERVICAL SPINE:    No acute cervical fracture or traumatic malalignment.    MRI would be required to evaluate the ligamentous structures at higher sensitivity as well as for better evaluation of the cervical canal and its contents.        --- End of Report ---            SUNIL CALERO MD; Attending Radiologist  This document has been electronically signed. Oct 27 2021 12:12AM

## 2021-10-28 LAB
ALBUMIN SERPL ELPH-MCNC: 2.7 G/DL — LOW (ref 3.3–5)
ALP SERPL-CCNC: 116 U/L — SIGNIFICANT CHANGE UP (ref 40–120)
ALT FLD-CCNC: 15 U/L — SIGNIFICANT CHANGE UP (ref 12–78)
ANION GAP SERPL CALC-SCNC: 7 MMOL/L — SIGNIFICANT CHANGE UP (ref 5–17)
ANION GAP SERPL CALC-SCNC: 7 MMOL/L — SIGNIFICANT CHANGE UP (ref 5–17)
APTT BLD: 29.6 SEC — SIGNIFICANT CHANGE UP (ref 27.5–35.5)
AST SERPL-CCNC: 17 U/L — SIGNIFICANT CHANGE UP (ref 15–37)
BILIRUB DIRECT SERPL-MCNC: 0.1 MG/DL — SIGNIFICANT CHANGE UP (ref 0.05–0.2)
BILIRUB INDIRECT FLD-MCNC: 0.3 MG/DL — SIGNIFICANT CHANGE UP (ref 0.2–1)
BILIRUB SERPL-MCNC: 0.4 MG/DL — SIGNIFICANT CHANGE UP (ref 0.2–1.2)
BUN SERPL-MCNC: 10 MG/DL — SIGNIFICANT CHANGE UP (ref 7–23)
BUN SERPL-MCNC: 11 MG/DL — SIGNIFICANT CHANGE UP (ref 7–23)
CALCIUM SERPL-MCNC: 8.9 MG/DL — SIGNIFICANT CHANGE UP (ref 8.5–10.1)
CALCIUM SERPL-MCNC: 9.2 MG/DL — SIGNIFICANT CHANGE UP (ref 8.5–10.1)
CHLORIDE SERPL-SCNC: 103 MMOL/L — SIGNIFICANT CHANGE UP (ref 96–108)
CHLORIDE SERPL-SCNC: 105 MMOL/L — SIGNIFICANT CHANGE UP (ref 96–108)
CO2 SERPL-SCNC: 30 MMOL/L — SIGNIFICANT CHANGE UP (ref 22–31)
CO2 SERPL-SCNC: 31 MMOL/L — SIGNIFICANT CHANGE UP (ref 22–31)
COVID-19 SPIKE DOMAIN AB INTERP: POSITIVE
COVID-19 SPIKE DOMAIN ANTIBODY RESULT: >250 U/ML — HIGH
CREAT SERPL-MCNC: 0.67 MG/DL — SIGNIFICANT CHANGE UP (ref 0.5–1.3)
CREAT SERPL-MCNC: 0.75 MG/DL — SIGNIFICANT CHANGE UP (ref 0.5–1.3)
CULTURE RESULTS: SIGNIFICANT CHANGE UP
GLUCOSE SERPL-MCNC: 141 MG/DL — HIGH (ref 70–99)
GLUCOSE SERPL-MCNC: 170 MG/DL — HIGH (ref 70–99)
HCT VFR BLD CALC: 31.9 % — LOW (ref 34.5–45)
HCV AB S/CO SERPL IA: 0.09 S/CO — SIGNIFICANT CHANGE UP (ref 0–0.99)
HCV AB SERPL-IMP: SIGNIFICANT CHANGE UP
HGB BLD-MCNC: 9.6 G/DL — LOW (ref 11.5–15.5)
INR BLD: 1.2 RATIO — HIGH (ref 0.88–1.16)
MAGNESIUM SERPL-MCNC: 2.2 MG/DL — SIGNIFICANT CHANGE UP (ref 1.6–2.6)
MAGNESIUM SERPL-MCNC: 2.4 MG/DL — SIGNIFICANT CHANGE UP (ref 1.6–2.6)
MCHC RBC-ENTMCNC: 26.3 PG — LOW (ref 27–34)
MCHC RBC-ENTMCNC: 30.1 GM/DL — LOW (ref 32–36)
MCV RBC AUTO: 87.4 FL — SIGNIFICANT CHANGE UP (ref 80–100)
NRBC # BLD: 0 /100 WBCS — SIGNIFICANT CHANGE UP (ref 0–0)
PHOSPHATE SERPL-MCNC: 4.1 MG/DL — SIGNIFICANT CHANGE UP (ref 2.5–4.5)
PHOSPHATE SERPL-MCNC: 4.7 MG/DL — HIGH (ref 2.5–4.5)
PLATELET # BLD AUTO: 522 K/UL — HIGH (ref 150–400)
POTASSIUM SERPL-MCNC: 3.6 MMOL/L — SIGNIFICANT CHANGE UP (ref 3.5–5.3)
POTASSIUM SERPL-MCNC: 4 MMOL/L — SIGNIFICANT CHANGE UP (ref 3.5–5.3)
POTASSIUM SERPL-SCNC: 3.6 MMOL/L — SIGNIFICANT CHANGE UP (ref 3.5–5.3)
POTASSIUM SERPL-SCNC: 4 MMOL/L — SIGNIFICANT CHANGE UP (ref 3.5–5.3)
PROT SERPL-MCNC: 7 G/DL — SIGNIFICANT CHANGE UP (ref 6–8.3)
PROTHROM AB SERPL-ACNC: 13.9 SEC — HIGH (ref 10.6–13.6)
RBC # BLD: 3.65 M/UL — LOW (ref 3.8–5.2)
RBC # FLD: 14.6 % — HIGH (ref 10.3–14.5)
SARS-COV-2 IGG+IGM SERPL QL IA: >250 U/ML — HIGH
SARS-COV-2 IGG+IGM SERPL QL IA: POSITIVE
SODIUM SERPL-SCNC: 141 MMOL/L — SIGNIFICANT CHANGE UP (ref 135–145)
SODIUM SERPL-SCNC: 142 MMOL/L — SIGNIFICANT CHANGE UP (ref 135–145)
SPECIMEN SOURCE: SIGNIFICANT CHANGE UP
WBC # BLD: 13.35 K/UL — HIGH (ref 3.8–10.5)
WBC # FLD AUTO: 13.35 K/UL — HIGH (ref 3.8–10.5)

## 2021-10-28 RX ADMIN — Medication 1 TABLET(S): at 05:54

## 2021-10-28 RX ADMIN — OXYCODONE HYDROCHLORIDE 10 MILLIGRAM(S): 5 TABLET ORAL at 07:22

## 2021-10-28 RX ADMIN — Medication 1 TABLET(S): at 21:35

## 2021-10-28 RX ADMIN — PANTOPRAZOLE SODIUM 40 MILLIGRAM(S): 20 TABLET, DELAYED RELEASE ORAL at 05:53

## 2021-10-28 RX ADMIN — Medication 100 MILLIGRAM(S): at 10:13

## 2021-10-28 RX ADMIN — Medication 1 TABLET(S): at 14:24

## 2021-10-28 RX ADMIN — Medication 100 MILLIGRAM(S): at 01:27

## 2021-10-28 RX ADMIN — ENOXAPARIN SODIUM 40 MILLIGRAM(S): 100 INJECTION SUBCUTANEOUS at 11:29

## 2021-10-28 RX ADMIN — LOSARTAN POTASSIUM 25 MILLIGRAM(S): 100 TABLET, FILM COATED ORAL at 05:54

## 2021-10-28 RX ADMIN — OXYCODONE HYDROCHLORIDE 10 MILLIGRAM(S): 5 TABLET ORAL at 12:40

## 2021-10-28 RX ADMIN — Medication 100 MILLIGRAM(S): at 11:31

## 2021-10-28 RX ADMIN — POLYETHYLENE GLYCOL 3350 17 GRAM(S): 17 POWDER, FOR SOLUTION ORAL at 21:35

## 2021-10-28 RX ADMIN — OXYCODONE HYDROCHLORIDE 10 MILLIGRAM(S): 5 TABLET ORAL at 17:04

## 2021-10-28 RX ADMIN — SENNA PLUS 2 TABLET(S): 8.6 TABLET ORAL at 21:35

## 2021-10-28 RX ADMIN — OXYCODONE HYDROCHLORIDE 10 MILLIGRAM(S): 5 TABLET ORAL at 22:59

## 2021-10-28 RX ADMIN — OXYCODONE HYDROCHLORIDE 5 MILLIGRAM(S): 5 TABLET ORAL at 00:42

## 2021-10-28 RX ADMIN — OXYCODONE HYDROCHLORIDE 10 MILLIGRAM(S): 5 TABLET ORAL at 18:04

## 2021-10-28 RX ADMIN — OXYCODONE HYDROCHLORIDE 5 MILLIGRAM(S): 5 TABLET ORAL at 08:27

## 2021-10-28 RX ADMIN — OXYCODONE HYDROCHLORIDE 5 MILLIGRAM(S): 5 TABLET ORAL at 00:03

## 2021-10-28 RX ADMIN — Medication 1 TABLET(S): at 11:29

## 2021-10-28 RX ADMIN — OXYCODONE HYDROCHLORIDE 10 MILLIGRAM(S): 5 TABLET ORAL at 06:13

## 2021-10-28 RX ADMIN — OXYCODONE HYDROCHLORIDE 10 MILLIGRAM(S): 5 TABLET ORAL at 11:40

## 2021-10-28 RX ADMIN — Medication 1 MILLIGRAM(S): at 11:29

## 2021-10-28 NOTE — OCCUPATIONAL THERAPY INITIAL EVALUATION ADULT - ADDITIONAL COMMENTS
Patient lives in a co-op with no steps to negotiate with her 28 y/o autistic son (high level). Patient has a shower/tub combo with grab bar on wall. Patient does not own any DME. Patient drives a car and was a community ambulator. Patient performed sit to stand and ambulated 15' using RW and min assist x 1. Pt reported feeling lightheaded s/p ambulation; bp 123/69 s/p ambulation. Patient requires assistance with ADL's and transfers due to THP's Right LE, decreased strength, decreased endurance and impaired sitting/standing balance.

## 2021-10-28 NOTE — OCCUPATIONAL THERAPY INITIAL EVALUATION ADULT - PERTINENT HX OF CURRENT PROBLEM, REHAB EVAL
72 y/o female s/p Right hip hemiarthroplasty 10/27/2021 by Dr. Segovia due to femoral neck fx s/p fall.

## 2021-10-28 NOTE — OCCUPATIONAL THERAPY INITIAL EVALUATION ADULT - GENERAL OBSERVATIONS, REHAB EVAL
Patient found supine with +IV lock, +SCD, +bandage right hip, +abduction pillow, +telemonitor, +external catheter, supplemental oxygen.

## 2021-10-28 NOTE — OCCUPATIONAL THERAPY INITIAL EVALUATION ADULT - PATIENT/FAMILY/SIGNIFICANT OTHER GOALS STATEMENT, OT EVAL
"This morning I lifted my son into the car seat and I hurt my back."  pt c/o lower back pain, has history of herniations
Patient would like to return to [facility].

## 2021-10-28 NOTE — PHYSICAL THERAPY INITIAL EVALUATION ADULT - PERTINENT HX OF CURRENT PROBLEM, REHAB EVAL
70 y/o F with hx of anxiety, HTN, migraines, IBS BIBA from home for evaluation of R hip pain s/p fall. Pt states that she fell and hit her head while in the kitchen with syncopal episode and has no recollection of events. Pt c/o severe R hip/groin pain and unable to walk. Pt given 5mg IV morphine by EMS en route to ED. Pt now denies syncope and reports fell over a flaw in the floor.

## 2021-10-29 LAB
HCT VFR BLD CALC: 27.3 % — LOW (ref 34.5–45)
HGB BLD-MCNC: 8.4 G/DL — LOW (ref 11.5–15.5)
MAGNESIUM SERPL-MCNC: 2.3 MG/DL — SIGNIFICANT CHANGE UP (ref 1.6–2.6)
MCHC RBC-ENTMCNC: 26.6 PG — LOW (ref 27–34)
MCHC RBC-ENTMCNC: 30.8 GM/DL — LOW (ref 32–36)
MCV RBC AUTO: 86.4 FL — SIGNIFICANT CHANGE UP (ref 80–100)
NRBC # BLD: 0 /100 WBCS — SIGNIFICANT CHANGE UP (ref 0–0)
PHOSPHATE SERPL-MCNC: 2.6 MG/DL — SIGNIFICANT CHANGE UP (ref 2.5–4.5)
PLATELET # BLD AUTO: 427 K/UL — HIGH (ref 150–400)
RBC # BLD: 3.16 M/UL — LOW (ref 3.8–5.2)
RBC # FLD: 14.8 % — HIGH (ref 10.3–14.5)
WBC # BLD: 14.02 K/UL — HIGH (ref 3.8–10.5)
WBC # FLD AUTO: 14.02 K/UL — HIGH (ref 3.8–10.5)

## 2021-10-29 RX ADMIN — Medication 1 TABLET(S): at 05:40

## 2021-10-29 RX ADMIN — PANTOPRAZOLE SODIUM 40 MILLIGRAM(S): 20 TABLET, DELAYED RELEASE ORAL at 05:40

## 2021-10-29 RX ADMIN — Medication 1 TABLET(S): at 21:28

## 2021-10-29 RX ADMIN — Medication 1 TABLET(S): at 11:20

## 2021-10-29 RX ADMIN — OXYCODONE HYDROCHLORIDE 10 MILLIGRAM(S): 5 TABLET ORAL at 22:46

## 2021-10-29 RX ADMIN — ENOXAPARIN SODIUM 40 MILLIGRAM(S): 100 INJECTION SUBCUTANEOUS at 11:20

## 2021-10-29 RX ADMIN — POLYETHYLENE GLYCOL 3350 17 GRAM(S): 17 POWDER, FOR SOLUTION ORAL at 21:28

## 2021-10-29 RX ADMIN — Medication 1 TABLET(S): at 17:28

## 2021-10-29 RX ADMIN — OXYCODONE HYDROCHLORIDE 10 MILLIGRAM(S): 5 TABLET ORAL at 00:15

## 2021-10-29 RX ADMIN — SENNA PLUS 2 TABLET(S): 8.6 TABLET ORAL at 21:28

## 2021-10-29 RX ADMIN — LOSARTAN POTASSIUM 25 MILLIGRAM(S): 100 TABLET, FILM COATED ORAL at 05:40

## 2021-10-29 RX ADMIN — OXYCODONE HYDROCHLORIDE 10 MILLIGRAM(S): 5 TABLET ORAL at 11:15

## 2021-10-29 RX ADMIN — OXYCODONE HYDROCHLORIDE 10 MILLIGRAM(S): 5 TABLET ORAL at 05:46

## 2021-10-29 RX ADMIN — Medication 100 MILLIGRAM(S): at 11:19

## 2021-10-29 RX ADMIN — Medication 1 MILLIGRAM(S): at 11:20

## 2021-10-29 RX ADMIN — OXYCODONE HYDROCHLORIDE 10 MILLIGRAM(S): 5 TABLET ORAL at 12:15

## 2021-10-29 RX ADMIN — OXYCODONE HYDROCHLORIDE 10 MILLIGRAM(S): 5 TABLET ORAL at 17:28

## 2021-10-29 NOTE — CHART NOTE - NSCHARTNOTEFT_GEN_A_CORE
Called by RN. Patient with 16 beats of VTACH. Patient seen and examined at bedside. Patient is asymptomatic. Denies any cp, dyspnea, lightheadedness. Lung exam clear lungs. Heart exam: RRR, no murmurs. STAT MG and Phos serum level ordered. RN to call for changes

## 2021-10-30 LAB
ANION GAP SERPL CALC-SCNC: 5 MMOL/L — SIGNIFICANT CHANGE UP (ref 5–17)
BUN SERPL-MCNC: 7 MG/DL — SIGNIFICANT CHANGE UP (ref 7–23)
CALCIUM SERPL-MCNC: 9.2 MG/DL — SIGNIFICANT CHANGE UP (ref 8.5–10.1)
CHLORIDE SERPL-SCNC: 100 MMOL/L — SIGNIFICANT CHANGE UP (ref 96–108)
CO2 SERPL-SCNC: 34 MMOL/L — HIGH (ref 22–31)
CREAT SERPL-MCNC: 0.56 MG/DL — SIGNIFICANT CHANGE UP (ref 0.5–1.3)
GLUCOSE SERPL-MCNC: 157 MG/DL — HIGH (ref 70–99)
HCT VFR BLD CALC: 28 % — LOW (ref 34.5–45)
HGB BLD-MCNC: 8.5 G/DL — LOW (ref 11.5–15.5)
MCHC RBC-ENTMCNC: 26.3 PG — LOW (ref 27–34)
MCHC RBC-ENTMCNC: 30.4 GM/DL — LOW (ref 32–36)
MCV RBC AUTO: 86.7 FL — SIGNIFICANT CHANGE UP (ref 80–100)
NRBC # BLD: 0 /100 WBCS — SIGNIFICANT CHANGE UP (ref 0–0)
PLATELET # BLD AUTO: 433 K/UL — HIGH (ref 150–400)
POTASSIUM SERPL-MCNC: 3.9 MMOL/L — SIGNIFICANT CHANGE UP (ref 3.5–5.3)
POTASSIUM SERPL-SCNC: 3.9 MMOL/L — SIGNIFICANT CHANGE UP (ref 3.5–5.3)
RBC # BLD: 3.23 M/UL — LOW (ref 3.8–5.2)
RBC # FLD: 14.9 % — HIGH (ref 10.3–14.5)
SODIUM SERPL-SCNC: 139 MMOL/L — SIGNIFICANT CHANGE UP (ref 135–145)
WBC # BLD: 13.16 K/UL — HIGH (ref 3.8–10.5)
WBC # FLD AUTO: 13.16 K/UL — HIGH (ref 3.8–10.5)

## 2021-10-30 RX ORDER — ONDANSETRON 8 MG/1
4 TABLET, FILM COATED ORAL ONCE
Refills: 0 | Status: COMPLETED | OUTPATIENT
Start: 2021-10-30 | End: 2021-10-30

## 2021-10-30 RX ADMIN — Medication 1 TABLET(S): at 14:44

## 2021-10-30 RX ADMIN — OXYCODONE HYDROCHLORIDE 10 MILLIGRAM(S): 5 TABLET ORAL at 22:30

## 2021-10-30 RX ADMIN — Medication 1 MILLIGRAM(S): at 11:03

## 2021-10-30 RX ADMIN — Medication 1 DROP(S): at 21:17

## 2021-10-30 RX ADMIN — Medication 1 TABLET(S): at 21:17

## 2021-10-30 RX ADMIN — PANTOPRAZOLE SODIUM 40 MILLIGRAM(S): 20 TABLET, DELAYED RELEASE ORAL at 05:16

## 2021-10-30 RX ADMIN — SENNA PLUS 2 TABLET(S): 8.6 TABLET ORAL at 21:17

## 2021-10-30 RX ADMIN — OXYCODONE HYDROCHLORIDE 10 MILLIGRAM(S): 5 TABLET ORAL at 11:40

## 2021-10-30 RX ADMIN — POLYETHYLENE GLYCOL 3350 17 GRAM(S): 17 POWDER, FOR SOLUTION ORAL at 21:17

## 2021-10-30 RX ADMIN — OXYCODONE HYDROCHLORIDE 10 MILLIGRAM(S): 5 TABLET ORAL at 18:21

## 2021-10-30 RX ADMIN — OXYCODONE HYDROCHLORIDE 10 MILLIGRAM(S): 5 TABLET ORAL at 05:23

## 2021-10-30 RX ADMIN — OXYCODONE HYDROCHLORIDE 10 MILLIGRAM(S): 5 TABLET ORAL at 01:37

## 2021-10-30 RX ADMIN — LOSARTAN POTASSIUM 25 MILLIGRAM(S): 100 TABLET, FILM COATED ORAL at 05:14

## 2021-10-30 RX ADMIN — ENOXAPARIN SODIUM 40 MILLIGRAM(S): 100 INJECTION SUBCUTANEOUS at 11:04

## 2021-10-30 RX ADMIN — Medication 100 MILLIGRAM(S): at 11:03

## 2021-10-30 RX ADMIN — OXYCODONE HYDROCHLORIDE 10 MILLIGRAM(S): 5 TABLET ORAL at 08:01

## 2021-10-30 RX ADMIN — Medication 1 TABLET(S): at 11:03

## 2021-10-30 RX ADMIN — OXYCODONE HYDROCHLORIDE 10 MILLIGRAM(S): 5 TABLET ORAL at 23:09

## 2021-10-30 RX ADMIN — Medication 1 TABLET(S): at 05:14

## 2021-10-30 RX ADMIN — OXYCODONE HYDROCHLORIDE 10 MILLIGRAM(S): 5 TABLET ORAL at 19:30

## 2021-10-30 RX ADMIN — OXYCODONE HYDROCHLORIDE 10 MILLIGRAM(S): 5 TABLET ORAL at 11:03

## 2021-10-30 NOTE — CHART NOTE - NSCHARTNOTEFT_GEN_A_CORE
Called by RN @1950 for continued nausea. Patient on zofran q6h prn with last dose @3pm.    Plan:   - give 1x dose zofran 4mg IV now  - Will continue to monitor, RN to call if any changes

## 2021-10-31 LAB
ANION GAP SERPL CALC-SCNC: 7 MMOL/L — SIGNIFICANT CHANGE UP (ref 5–17)
BUN SERPL-MCNC: 6 MG/DL — LOW (ref 7–23)
CALCIUM SERPL-MCNC: 9.5 MG/DL — SIGNIFICANT CHANGE UP (ref 8.5–10.1)
CHLORIDE SERPL-SCNC: 98 MMOL/L — SIGNIFICANT CHANGE UP (ref 96–108)
CO2 SERPL-SCNC: 33 MMOL/L — HIGH (ref 22–31)
CREAT SERPL-MCNC: 0.49 MG/DL — LOW (ref 0.5–1.3)
GLUCOSE SERPL-MCNC: 132 MG/DL — HIGH (ref 70–99)
HCT VFR BLD CALC: 27.6 % — LOW (ref 34.5–45)
HGB BLD-MCNC: 8.5 G/DL — LOW (ref 11.5–15.5)
MCHC RBC-ENTMCNC: 26.6 PG — LOW (ref 27–34)
MCHC RBC-ENTMCNC: 30.8 GM/DL — LOW (ref 32–36)
MCV RBC AUTO: 86.3 FL — SIGNIFICANT CHANGE UP (ref 80–100)
NRBC # BLD: 0 /100 WBCS — SIGNIFICANT CHANGE UP (ref 0–0)
PLATELET # BLD AUTO: 440 K/UL — HIGH (ref 150–400)
POTASSIUM SERPL-MCNC: 3.5 MMOL/L — SIGNIFICANT CHANGE UP (ref 3.5–5.3)
POTASSIUM SERPL-SCNC: 3.5 MMOL/L — SIGNIFICANT CHANGE UP (ref 3.5–5.3)
RBC # BLD: 3.2 M/UL — LOW (ref 3.8–5.2)
RBC # FLD: 14.8 % — HIGH (ref 10.3–14.5)
SODIUM SERPL-SCNC: 138 MMOL/L — SIGNIFICANT CHANGE UP (ref 135–145)
WBC # BLD: 11.66 K/UL — HIGH (ref 3.8–10.5)
WBC # FLD AUTO: 11.66 K/UL — HIGH (ref 3.8–10.5)

## 2021-10-31 RX ADMIN — SENNA PLUS 2 TABLET(S): 8.6 TABLET ORAL at 21:13

## 2021-10-31 RX ADMIN — Medication 1 DROP(S): at 17:58

## 2021-10-31 RX ADMIN — OXYCODONE HYDROCHLORIDE 10 MILLIGRAM(S): 5 TABLET ORAL at 11:10

## 2021-10-31 RX ADMIN — Medication 1 MILLIGRAM(S): at 11:11

## 2021-10-31 RX ADMIN — OXYCODONE HYDROCHLORIDE 10 MILLIGRAM(S): 5 TABLET ORAL at 16:45

## 2021-10-31 RX ADMIN — OXYCODONE HYDROCHLORIDE 10 MILLIGRAM(S): 5 TABLET ORAL at 23:51

## 2021-10-31 RX ADMIN — LOSARTAN POTASSIUM 25 MILLIGRAM(S): 100 TABLET, FILM COATED ORAL at 05:18

## 2021-10-31 RX ADMIN — OXYCODONE HYDROCHLORIDE 10 MILLIGRAM(S): 5 TABLET ORAL at 20:00

## 2021-10-31 RX ADMIN — OXYCODONE HYDROCHLORIDE 10 MILLIGRAM(S): 5 TABLET ORAL at 12:00

## 2021-10-31 RX ADMIN — Medication 1 DROP(S): at 09:06

## 2021-10-31 RX ADMIN — Medication 1 TABLET(S): at 05:18

## 2021-10-31 RX ADMIN — Medication 1 TABLET(S): at 11:11

## 2021-10-31 RX ADMIN — Medication 1 DROP(S): at 21:12

## 2021-10-31 RX ADMIN — POLYETHYLENE GLYCOL 3350 17 GRAM(S): 17 POWDER, FOR SOLUTION ORAL at 21:12

## 2021-10-31 RX ADMIN — ENOXAPARIN SODIUM 40 MILLIGRAM(S): 100 INJECTION SUBCUTANEOUS at 11:11

## 2021-10-31 RX ADMIN — OXYCODONE HYDROCHLORIDE 10 MILLIGRAM(S): 5 TABLET ORAL at 05:48

## 2021-10-31 RX ADMIN — OXYCODONE HYDROCHLORIDE 10 MILLIGRAM(S): 5 TABLET ORAL at 19:50

## 2021-10-31 RX ADMIN — OXYCODONE HYDROCHLORIDE 10 MILLIGRAM(S): 5 TABLET ORAL at 06:46

## 2021-10-31 RX ADMIN — OXYCODONE HYDROCHLORIDE 10 MILLIGRAM(S): 5 TABLET ORAL at 15:50

## 2021-10-31 RX ADMIN — PANTOPRAZOLE SODIUM 40 MILLIGRAM(S): 20 TABLET, DELAYED RELEASE ORAL at 05:18

## 2021-10-31 RX ADMIN — Medication 1 TABLET(S): at 15:01

## 2021-10-31 RX ADMIN — Medication 1 TABLET(S): at 21:13

## 2021-11-01 ENCOUNTER — TRANSCRIPTION ENCOUNTER (OUTPATIENT)
Age: 71
End: 2021-11-01

## 2021-11-01 VITALS
TEMPERATURE: 98 F | SYSTOLIC BLOOD PRESSURE: 136 MMHG | RESPIRATION RATE: 19 BRPM | DIASTOLIC BLOOD PRESSURE: 76 MMHG | HEART RATE: 108 BPM | OXYGEN SATURATION: 93 %

## 2021-11-01 LAB
ANION GAP SERPL CALC-SCNC: 7 MMOL/L — SIGNIFICANT CHANGE UP (ref 5–17)
BUN SERPL-MCNC: 6 MG/DL — LOW (ref 7–23)
CALCIUM SERPL-MCNC: 9.5 MG/DL — SIGNIFICANT CHANGE UP (ref 8.5–10.1)
CHLORIDE SERPL-SCNC: 97 MMOL/L — SIGNIFICANT CHANGE UP (ref 96–108)
CK MB BLD-MCNC: <1.4 % — SIGNIFICANT CHANGE UP (ref 0–3.5)
CK MB CFR SERPL CALC: <1 NG/ML — SIGNIFICANT CHANGE UP (ref 0–3.6)
CK SERPL-CCNC: 71 U/L — SIGNIFICANT CHANGE UP (ref 26–192)
CO2 SERPL-SCNC: 33 MMOL/L — HIGH (ref 22–31)
CREAT SERPL-MCNC: 0.5 MG/DL — SIGNIFICANT CHANGE UP (ref 0.5–1.3)
GLUCOSE SERPL-MCNC: 115 MG/DL — HIGH (ref 70–99)
HCT VFR BLD CALC: 27.6 % — LOW (ref 34.5–45)
HGB BLD-MCNC: 8.5 G/DL — LOW (ref 11.5–15.5)
MCHC RBC-ENTMCNC: 26.4 PG — LOW (ref 27–34)
MCHC RBC-ENTMCNC: 30.8 GM/DL — LOW (ref 32–36)
MCV RBC AUTO: 85.7 FL — SIGNIFICANT CHANGE UP (ref 80–100)
NRBC # BLD: 0 /100 WBCS — SIGNIFICANT CHANGE UP (ref 0–0)
PLATELET # BLD AUTO: 509 K/UL — HIGH (ref 150–400)
POTASSIUM SERPL-MCNC: 3.6 MMOL/L — SIGNIFICANT CHANGE UP (ref 3.5–5.3)
POTASSIUM SERPL-SCNC: 3.6 MMOL/L — SIGNIFICANT CHANGE UP (ref 3.5–5.3)
RBC # BLD: 3.22 M/UL — LOW (ref 3.8–5.2)
RBC # FLD: 14.7 % — HIGH (ref 10.3–14.5)
SARS-COV-2 RNA SPEC QL NAA+PROBE: SIGNIFICANT CHANGE UP
SODIUM SERPL-SCNC: 137 MMOL/L — SIGNIFICANT CHANGE UP (ref 135–145)
TROPONIN I, HIGH SENSITIVITY RESULT: 45.7 NG/L — SIGNIFICANT CHANGE UP
TROPONIN I, HIGH SENSITIVITY RESULT: 51.2 NG/L — SIGNIFICANT CHANGE UP
WBC # BLD: 14.31 K/UL — HIGH (ref 3.8–10.5)
WBC # FLD AUTO: 14.31 K/UL — HIGH (ref 3.8–10.5)

## 2021-11-01 PROCEDURE — 88311 DECALCIFY TISSUE: CPT

## 2021-11-01 PROCEDURE — 84100 ASSAY OF PHOSPHORUS: CPT

## 2021-11-01 PROCEDURE — 97166 OT EVAL MOD COMPLEX 45 MIN: CPT

## 2021-11-01 PROCEDURE — 93005 ELECTROCARDIOGRAM TRACING: CPT

## 2021-11-01 PROCEDURE — 80307 DRUG TEST PRSMV CHEM ANLYZR: CPT

## 2021-11-01 PROCEDURE — 99285 EMERGENCY DEPT VISIT HI MDM: CPT

## 2021-11-01 PROCEDURE — 80048 BASIC METABOLIC PNL TOTAL CA: CPT

## 2021-11-01 PROCEDURE — 97162 PT EVAL MOD COMPLEX 30 MIN: CPT

## 2021-11-01 PROCEDURE — 85025 COMPLETE CBC W/AUTO DIFF WBC: CPT

## 2021-11-01 PROCEDURE — 93010 ELECTROCARDIOGRAM REPORT: CPT

## 2021-11-01 PROCEDURE — U0005: CPT

## 2021-11-01 PROCEDURE — 72125 CT NECK SPINE W/O DYE: CPT | Mod: MA

## 2021-11-01 PROCEDURE — 86901 BLOOD TYPING SEROLOGIC RH(D): CPT

## 2021-11-01 PROCEDURE — 82553 CREATINE MB FRACTION: CPT

## 2021-11-01 PROCEDURE — 73501 X-RAY EXAM HIP UNI 1 VIEW: CPT

## 2021-11-01 PROCEDURE — 84484 ASSAY OF TROPONIN QUANT: CPT

## 2021-11-01 PROCEDURE — 73552 X-RAY EXAM OF FEMUR 2/>: CPT

## 2021-11-01 PROCEDURE — 96374 THER/PROPH/DIAG INJ IV PUSH: CPT

## 2021-11-01 PROCEDURE — 97530 THERAPEUTIC ACTIVITIES: CPT

## 2021-11-01 PROCEDURE — 80053 COMPREHEN METABOLIC PANEL: CPT

## 2021-11-01 PROCEDURE — 85027 COMPLETE CBC AUTOMATED: CPT

## 2021-11-01 PROCEDURE — 73502 X-RAY EXAM HIP UNI 2-3 VIEWS: CPT

## 2021-11-01 PROCEDURE — 97112 NEUROMUSCULAR REEDUCATION: CPT

## 2021-11-01 PROCEDURE — 85730 THROMBOPLASTIN TIME PARTIAL: CPT

## 2021-11-01 PROCEDURE — 80076 HEPATIC FUNCTION PANEL: CPT

## 2021-11-01 PROCEDURE — 97110 THERAPEUTIC EXERCISES: CPT

## 2021-11-01 PROCEDURE — 97535 SELF CARE MNGMENT TRAINING: CPT

## 2021-11-01 PROCEDURE — 88305 TISSUE EXAM BY PATHOLOGIST: CPT

## 2021-11-01 PROCEDURE — 87635 SARS-COV-2 COVID-19 AMP PRB: CPT

## 2021-11-01 PROCEDURE — 36415 COLL VENOUS BLD VENIPUNCTURE: CPT

## 2021-11-01 PROCEDURE — 73562 X-RAY EXAM OF KNEE 3: CPT

## 2021-11-01 PROCEDURE — 86850 RBC ANTIBODY SCREEN: CPT

## 2021-11-01 PROCEDURE — 71045 X-RAY EXAM CHEST 1 VIEW: CPT

## 2021-11-01 PROCEDURE — 87086 URINE CULTURE/COLONY COUNT: CPT

## 2021-11-01 PROCEDURE — C1776: CPT

## 2021-11-01 PROCEDURE — 82550 ASSAY OF CK (CPK): CPT

## 2021-11-01 PROCEDURE — 70450 CT HEAD/BRAIN W/O DYE: CPT | Mod: MA

## 2021-11-01 PROCEDURE — 97116 GAIT TRAINING THERAPY: CPT

## 2021-11-01 PROCEDURE — 86803 HEPATITIS C AB TEST: CPT

## 2021-11-01 PROCEDURE — 83735 ASSAY OF MAGNESIUM: CPT

## 2021-11-01 PROCEDURE — U0003: CPT

## 2021-11-01 PROCEDURE — 81001 URINALYSIS AUTO W/SCOPE: CPT

## 2021-11-01 PROCEDURE — 86769 SARS-COV-2 COVID-19 ANTIBODY: CPT

## 2021-11-01 PROCEDURE — 86900 BLOOD TYPING SEROLOGIC ABO: CPT

## 2021-11-01 PROCEDURE — 85610 PROTHROMBIN TIME: CPT

## 2021-11-01 RX ORDER — SENNA PLUS 8.6 MG/1
2 TABLET ORAL
Qty: 0 | Refills: 0 | DISCHARGE
Start: 2021-11-01

## 2021-11-01 RX ORDER — OXYCODONE HYDROCHLORIDE 5 MG/1
1 TABLET ORAL
Qty: 0 | Refills: 0 | DISCHARGE
Start: 2021-11-01

## 2021-11-01 RX ORDER — ACETAMINOPHEN 500 MG
2 TABLET ORAL
Qty: 0 | Refills: 0 | DISCHARGE
Start: 2021-11-01

## 2021-11-01 RX ORDER — ENOXAPARIN SODIUM 100 MG/ML
40 INJECTION SUBCUTANEOUS
Qty: 0 | Refills: 0 | DISCHARGE
Start: 2021-11-01

## 2021-11-01 RX ORDER — POLYETHYLENE GLYCOL 3350 17 G/17G
17 POWDER, FOR SOLUTION ORAL
Qty: 0 | Refills: 0 | DISCHARGE
Start: 2021-11-01

## 2021-11-01 RX ADMIN — OXYCODONE HYDROCHLORIDE 10 MILLIGRAM(S): 5 TABLET ORAL at 13:47

## 2021-11-01 RX ADMIN — ONDANSETRON 4 MILLIGRAM(S): 8 TABLET, FILM COATED ORAL at 08:29

## 2021-11-01 RX ADMIN — OXYCODONE HYDROCHLORIDE 10 MILLIGRAM(S): 5 TABLET ORAL at 10:30

## 2021-11-01 RX ADMIN — LOSARTAN POTASSIUM 25 MILLIGRAM(S): 100 TABLET, FILM COATED ORAL at 05:31

## 2021-11-01 RX ADMIN — PANTOPRAZOLE SODIUM 40 MILLIGRAM(S): 20 TABLET, DELAYED RELEASE ORAL at 05:31

## 2021-11-01 RX ADMIN — OXYCODONE HYDROCHLORIDE 10 MILLIGRAM(S): 5 TABLET ORAL at 05:30

## 2021-11-01 RX ADMIN — OXYCODONE HYDROCHLORIDE 5 MILLIGRAM(S): 5 TABLET ORAL at 18:06

## 2021-11-01 RX ADMIN — Medication 1 DROP(S): at 11:42

## 2021-11-01 RX ADMIN — Medication 1 TABLET(S): at 11:42

## 2021-11-01 RX ADMIN — OXYCODONE HYDROCHLORIDE 10 MILLIGRAM(S): 5 TABLET ORAL at 00:33

## 2021-11-01 RX ADMIN — OXYCODONE HYDROCHLORIDE 10 MILLIGRAM(S): 5 TABLET ORAL at 09:30

## 2021-11-01 RX ADMIN — ENOXAPARIN SODIUM 40 MILLIGRAM(S): 100 INJECTION SUBCUTANEOUS at 11:42

## 2021-11-01 RX ADMIN — Medication 1 MILLIGRAM(S): at 09:10

## 2021-11-01 RX ADMIN — OXYCODONE HYDROCHLORIDE 10 MILLIGRAM(S): 5 TABLET ORAL at 14:49

## 2021-11-01 RX ADMIN — Medication 1 TABLET(S): at 05:31

## 2021-11-01 RX ADMIN — OXYCODONE HYDROCHLORIDE 5 MILLIGRAM(S): 5 TABLET ORAL at 12:49

## 2021-11-01 RX ADMIN — OXYCODONE HYDROCHLORIDE 5 MILLIGRAM(S): 5 TABLET ORAL at 17:06

## 2021-11-01 RX ADMIN — Medication 1 MILLIGRAM(S): at 11:42

## 2021-11-01 RX ADMIN — Medication 1 TABLET(S): at 11:43

## 2021-11-01 RX ADMIN — OXYCODONE HYDROCHLORIDE 10 MILLIGRAM(S): 5 TABLET ORAL at 18:40

## 2021-11-01 RX ADMIN — OXYCODONE HYDROCHLORIDE 5 MILLIGRAM(S): 5 TABLET ORAL at 11:48

## 2021-11-01 NOTE — PROGRESS NOTE ADULT - PROBLEM SELECTOR PROBLEM 3
Moderate alcohol use disorder

## 2021-11-01 NOTE — PROGRESS NOTE ADULT - PROBLEM SELECTOR PLAN 1
S/P R HIP HEMIARTHOPLASTY -- POD #1  Continue post-op care  PT  DVT prophylaxis  D/C planning to [facility]
S/P R HIP HEMIARTHOPLASTY -- POD #3  Continue post-op care  PT  DVT prophylaxis  D/C planning to [facility]
S/P R HIP HEMIARTHOPLASTY -- POD #5  Continue post-op care  PT  DVT prophylaxis  D/C planning to [facility]
S/P R HIP HEMIARTHOPLASTY -- POD #4  Continue post-op care  PT  DVT prophylaxis  D/C planning to [facility]
S/P R HIP HEMIARTHOPLASTY -- POD #2  Continue post-op care  PT  DVT prophylaxis  D/C planning to [facility]

## 2021-11-01 NOTE — CHART NOTE - NSCHARTNOTEFT_GEN_A_CORE
Called by RN for patient c/o chest pain. Patient seen and evaluated at the bedside. She is c/o left sided chest pain, starting under left breast and radiating around side. She reports she was laying in bed when pain suddenly began. Not positional, no change with palpation. Admits to being extremely anxious over her current health situation. Has been trying to relax but she reports she cannot. Denies chest pressure, palpitations, SOB, nausea, vomiting.     Vital Signs Last 24 Hrs  T(C): 37.1 (01 Nov 2021 08:55), Max: 37.2 (01 Nov 2021 04:30)  T(F): 98.8 (01 Nov 2021 08:55), Max: 99 (01 Nov 2021 04:30)  HR: 81 (01 Nov 2021 08:55) (81 - 100)  BP: 145/78 (01 Nov 2021 08:55) (131/76 - 145/78)  RR: 19 (01 Nov 2021 08:55) (18 - 19)  SpO2: 93% (01 Nov 2021 08:55) (93% - 93%)    Gen- NAD  Cardio- RRR, +S1/S2, no murmurs  Resp- CTA b/l, no wheezing, rhonchi or rales   Neuro- AA&Ox3, anxious appearing     A/P: Called by RN for patient c/o chest pain. Patient seen and evaluated at the bedside. She is c/o left sided chest pain, starting under left breast and radiating around side. She reports she was laying in bed when pain suddenly began. Not positional, no change with palpation. Admits to being extremely anxious over her current health situation. Has been trying to relax but she reports she cannot. Denies chest pressure, palpitations, SOB, nausea, vomiting.     Vital Signs Last 24 Hrs  T(C): 37.1 (01 Nov 2021 08:55), Max: 37.2 (01 Nov 2021 04:30)  T(F): 98.8 (01 Nov 2021 08:55), Max: 99 (01 Nov 2021 04:30)  HR: 81 (01 Nov 2021 08:55) (81 - 100)  BP: 145/78 (01 Nov 2021 08:55) (131/76 - 145/78)  RR: 19 (01 Nov 2021 08:55) (18 - 19)  SpO2: 93% (01 Nov 2021 08:55) (93% - 93%)    Gen- NAD  Cardio- RRR, +S1/S2, no murmurs  Resp- CTA b/l, no wheezing, rhonchi or rales   Neuro- AA&Ox3, anxious appearing     A/P: 72 y/o female with PMHx HTN, s/p R hemiarthroplasty POD #5 now c/o chest pain  - STAT EKG, cardiac enzymes   - Patient attributing pain to anxiety and "being stuck in bed"  - Will f/u results of above  - Cardiology following  - RN to call with any changes

## 2021-11-01 NOTE — PROGRESS NOTE ADULT - ASSESSMENT
71 year old female with a history of HTN who is admitted with a hip fracture.    POD 1   right hip - hemiarthroplasty -   vs noted -     right hip fx  OP  OA  Fall  ? etoh use disorder - pt vehemently denies alcohol use - unable to reach family -   vs note d- labs reviewed - imaging reviewed - ortho eval noted    will add ciwa scale -   will att Benzo PRN for ciwa trigger  alcohol use disorder is under investigation / work up as pt is flat out denying drinking    cardio eval noted  medical optimization  on tele monitor
71 year old female with a history of HTN who is admitted with a hip fracture.    POD 4  right hip - hemiarthroplasty -   vs noted -     plan for MAGALIE - CM notes reviewed -   ortho follow up    right hip fx  OP  OA  Fall  ? etoh use disorder - pt vehemently denies alcohol use - unable to reach family -   vs note d- labs reviewed - imaging reviewed - ortho eval noted      cardio eval noted  medical optimization  on tele monitor
The patient is a 71 year old female with a history of HTN who is admitted with a hip fracture.    Plan:  - ECG with no evidence of ischemia or infarction  - As per patient, normal echo and stress test within the last two years  - Continue losartan 25 mg daily  - Ortho follow-up  - PT
The patient is a 71 year old female with a history of HTN who is admitted with a hip fracture.    Plan:  - ECG with no evidence of ischemia or infarction  - Brief AT on telemetry - chronic issue - discontinue telemetry  - Continue losartan 25 mg daily  - Ortho follow-up  - PT  - Discharge planning
The patient is a 71 year old female with a history of HTN who is admitted with a hip fracture.    Plan:  - ECG with no evidence of ischemia or infarction  - Brief AT on telemetry - chronic issue - now off of telemetry  - Continue losartan 25 mg daily  - Ortho follow-up  - PT  - Discharge planning
The patient is a 71 year old female with a history of HTN who is admitted with a hip fracture.    Plan:  - ECG with no evidence of ischemia or infarction  - Brief AT on telemetry - chronic issue - now off of telemetry  - Continue losartan 25 mg daily  - Ortho follow-up  - PT  - Discharge planning
71 year old female with a history of HTN who is admitted with a hip fracture.    POD 1   right hip - hemiarthroplasty -   vs noted -     plan for MAGALIE  ortho follow up    right hip fx  OP  OA  Fall  ? etoh use disorder - pt vehemently denies alcohol use - unable to reach family -   vs note d- labs reviewed - imaging reviewed - ortho eval noted    will add ciwa scale -   will add Benzo PRN for ciwa trigger  alcohol use disorder is under investigation / work up as pt is flat out denying drinking    cardio eval noted  medical optimization  on tele monitor  
The patient is a 71 year old female with a history of HTN who is admitted with a hip fracture.    Plan:  - ECG with no evidence of ischemia or infarction  - Brief AT on telemetry - chronic issue - now off of telemetry  - Continue losartan 25 mg daily  - Ortho follow-up  - PT  - Discharge planning
71 year old female with a history of HTN who is admitted with a hip fracture.    POD 2  right hip - hemiarthroplasty -   vs noted -     plan for MAGALIE - CM notes reviewed -   ortho follow up    right hip fx  OP  OA  Fall  ? etoh use disorder - pt vehemently denies alcohol use - unable to reach family -   vs note d- labs reviewed - imaging reviewed - ortho eval noted    will add ciwa scale -   will add Benzo PRN for ciwa trigger  alcohol use disorder is under investigation / work up as pt is flat out denying drinking    cardio eval noted  medical optimization  on tele monitor
71 year old female with a history of HTN who is admitted with a hip fracture.    POD 3  right hip - hemiarthroplasty -   vs noted -     plan for MAGALIE - CM notes reviewed -   ortho follow up    right hip fx  OP  OA  Fall  ? etoh use disorder - pt vehemently denies alcohol use - unable to reach family -   vs note d- labs reviewed - imaging reviewed - ortho eval noted    will add ciwa scale -   will add Benzo PRN for ciwa trigger  alcohol use disorder is under investigation / work up as pt is flat out denying drinking    cardio eval noted  medical optimization  on tele monitor

## 2021-11-01 NOTE — PROGRESS NOTE ADULT - PROVIDER SPECIALTY LIST ADULT
Cardiology
Orthopedics
Anesthesia
Cardiology
Orthopedics
Pulmonology
Addiction Medicine
Cardiology
Orthopedics
Internal Medicine
Pulmonology
Internal Medicine

## 2021-11-01 NOTE — DISCHARGE NOTE NURSING/CASE MANAGEMENT/SOCIAL WORK - PATIENT PORTAL LINK FT
You can access the FollowMyHealth Patient Portal offered by [facility] by registering at the following website: [email or web address]. By joining [facility]’s FollowMyHealth portal, you will also be able to view your health information using other applications (apps) compatible with our system.

## 2021-11-01 NOTE — PROGRESS NOTE ADULT - SUBJECTIVE AND OBJECTIVE BOX
Chief Complaint: Hip fracture    Interval Events: No events overnight.    Review of Systems:  General: No fevers, chills, weight loss or gain  Skin: No rashes, color changes  Cardiovascular: No chest pain, orthopnea  Respiratory: No shortness of breath, cough  Gastrointestinal: No nausea, abdominal pain  Genitourinary: No incontinence, pain with urination  Musculoskeletal: No pain, swelling, decreased range of motion  Neurological: No headache, weakness  Psychiatric: No depression, anxiety  Endocrine: No weight loss or gain, increased thirst  All other systems are comprehensively negative.    Physical Exam:  Vital Signs Last 24 Hrs  T(C): 37.4 (30 Oct 2021 04:25), Max: 37.7 (29 Oct 2021 17:37)  T(F): 99.3 (30 Oct 2021 04:25), Max: 99.9 (29 Oct 2021 17:37)  HR: 100 (30 Oct 2021 04:25) (100 - 122)  BP: 130/74 (30 Oct 2021 04:25) (111/73 - 134/81)  BP(mean): --  RR: 18 (30 Oct 2021 04:25) (18 - 18)  SpO2: 92% (30 Oct 2021 04:25) (92% - 96%)  General: NAD  HEENT: MMM  Neck: No JVD, no carotid bruit  Lungs: CTAB  CV: RRR, nl S1/S2, no M/R/G  Abdomen: S/NT/ND, +BS  Extremities: No LE edema, no cyanosis  Neuro: AAOx3, non-focal  Skin: No rash    Labs:             10-30    139  |  100  |  7   ----------------------------<  157<H>  3.9   |  34<H>  |  0.56    Ca    9.2      30 Oct 2021 07:53  Phos  2.6     10-29  Mg     2.3     10-29                          8.5    13.16 )-----------( 433      ( 30 Oct 2021 07:53 )             28.0   
Chief Complaint: Hip fracture    Interval Events: No events overnight. Sleeping.    Review of Systems:  General: No fevers, chills, weight loss or gain  Skin: No rashes, color changes  Cardiovascular: No chest pain, orthopnea  Respiratory: No shortness of breath, cough  Gastrointestinal: No nausea, abdominal pain  Genitourinary: No incontinence, pain with urination  Musculoskeletal: No pain, swelling, decreased range of motion  Neurological: No headache, weakness  Psychiatric: No depression, anxiety  Endocrine: No weight loss or gain, increased thirst  All other systems are comprehensively negative.    Physical Exam:  Vital Signs Last 24 Hrs  T(C): 37.2 (31 Oct 2021 04:30), Max: 37.4 (30 Oct 2021 20:46)  T(F): 98.9 (31 Oct 2021 04:30), Max: 99.3 (30 Oct 2021 20:46)  HR: 98 (31 Oct 2021 04:30) (98 - 102)  BP: 155/84 (31 Oct 2021 04:30) (144/73 - 155/84)  BP(mean): --  RR: 18 (31 Oct 2021 04:30) (18 - 18)  SpO2: 95% (31 Oct 2021 04:30) (93% - 95%)  General: NAD  HEENT: MMM  Neck: No JVD, no carotid bruit  Lungs: CTAB  CV: RRR, nl S1/S2, no M/R/G  Abdomen: S/NT/ND, +BS  Extremities: No LE edema, no cyanosis  Neuro: AAOx3, non-focal  Skin: No rash    Labs:    10-31    138  |  98  |  6<L>  ----------------------------<  132<H>  3.5   |  33<H>  |  0.49<L>    Ca    9.5      31 Oct 2021 07:28                          8.5    11.66 )-----------( 440      ( 31 Oct 2021 07:28 )             27.6   
Patient is a 71y old  Female who presents with a chief complaint of fall (30 Oct 2021 07:38)      INTERVAL HPI/OVERNIGHT EVENTS: Patient seen and examined. NAD. No complaints.    Vital Signs Last 24 Hrs  T(C): 37.2 (30 Oct 2021 09:22), Max: 37.7 (29 Oct 2021 17:37)  T(F): 99 (30 Oct 2021 09:22), Max: 99.9 (29 Oct 2021 17:37)  HR: 96 (30 Oct 2021 09:22) (96 - 108)  BP: 128/75 (30 Oct 2021 09:22) (128/75 - 134/81)  BP(mean): --  RR: 18 (30 Oct 2021 09:22) (18 - 18)  SpO2: 93% (30 Oct 2021 09:22) (92% - 94%)    10-30    139  |  100  |  7   ----------------------------<  157<H>  3.9   |  34<H>  |  0.56    Ca    9.2      30 Oct 2021 07:53  Phos  2.6     10-29  Mg     2.3     10-29                            8.5    13.16 )-----------( 433      ( 30 Oct 2021 07:53 )             28.0       CAPILLARY BLOOD GLUCOSE                  acetaminophen     Tablet .. 650 milliGRAM(s) Oral every 6 hours PRN  bisacodyl Suppository 10 milliGRAM(s) Rectal once PRN  calcium carbonate 1250 mG  + Vitamin D (OsCal 500 + D) 1 Tablet(s) Oral three times a day  clonazePAM  Tablet 1 milliGRAM(s) Oral at bedtime PRN  enoxaparin Injectable 40 milliGRAM(s) SubCutaneous daily  folic acid 1 milliGRAM(s) Oral daily  LORazepam   Injectable 1 milliGRAM(s) IV Push every 1 hour PRN  losartan 25 milliGRAM(s) Oral daily  magnesium hydroxide Suspension 30 milliLiter(s) Oral daily PRN  melatonin 3 milliGRAM(s) Oral at bedtime PRN  multivitamin 1 Tablet(s) Oral daily  ondansetron Injectable 4 milliGRAM(s) IV Push every 6 hours PRN  oxyCODONE    IR 5 milliGRAM(s) Oral every 4 hours PRN  oxyCODONE    IR 10 milliGRAM(s) Oral every 4 hours PRN  pantoprazole    Tablet 40 milliGRAM(s) Oral before breakfast  polyethylene glycol 3350 17 Gram(s) Oral at bedtime  senna 2 Tablet(s) Oral at bedtime              REVIEW OF SYSTEMS:  CONSTITUTIONAL: No fever, no weight loss, or no fatigue  NECK: No pain, no stiffness  RESPIRATORY: No cough, no wheezing, no chills, no hemoptysis, No shortness of breath  CARDIOVASCULAR: No chest pain, no palpitations, no dizziness, no leg swelling  GASTROINTESTINAL: No abdominal pain. No nausea, no vomiting, no hematemesis; No diarrhea, no constipation. No melena, no hematochezia.  GENITOURINARY: No dysuria, no frequency, no hematuria, no incontinence  NEUROLOGICAL: No headaches, no loss of strength, no numbness, no tremors  SKIN: No itching, no burning  MUSCULOSKELETAL: No joint pain, no swelling; No muscle, no back, no extremity pain  PSYCHIATRIC: No depression, no mood swings,   HEME/LYMPH: No easy bruising, no bleeding gums  ALLERY AND IMMUNOLOGIC: No hives       Consultant(s) Notes Reviewed:  [X] YES  [ ] NO    PHYSICAL EXAM:  GENERAL: NAD  HEAD:  Atraumatic, Normocephalic  EYES: EOMI, PERRLA, conjunctiva and sclera clear  ENMT: No tonsillar erythema, exudates, or enlargement; Moist mucous membranes  NECK: Supple, No JVD  NERVOUS SYSTEM:  Awake & alert  CHEST/LUNG: Clear to auscultation bilaterally; No rales, rhonchi, wheezing,  HEART: Regular rate and rhythm  ABDOMEN: Soft, Nontender, Nondistended; Bowel sounds present  EXTREMITIES:  No clubbing, cyanosis, or edema  LYMPH: No lymphadenopathy noted  SKIN: No rashes      Advanced care planning discussed with patient/family [X] YES   [ ] NO    Advanced care planning discussed with patient/family. Patient's health status was discussed. All appropriate changes have been made regarding patient's end-of-life care. Advanced care planning forms reviewed/discussed/completed.  20 minutes spent.   
No acute events overnight, but states she had been experiencing episodes of nausea over the course of the evening prior which have since resolved. Patient interviewed and examined at bedside, well-appearing and in no acute distress. RLE pain well-controlled on current analgesic regimen and denies any accompanying weakness, numbness, or tingling of the RLE or in any other anatomic distribution. Endorses mild headache which she states is due to sleep deprivation and neck positioning, but otherwise denies chest pain, shortness of breath, nausea, vomiting, or any other orthopaedic concerns or complaints at time of encounter.       VITAL SIGNS:  T(C): 37.2 (31 Oct 2021 04:30), Max: 37.4 (30 Oct 2021 20:46)  T(F): 98.9 (31 Oct 2021 04:30), Max: 99.3 (30 Oct 2021 20:46)  HR: 98 (31 Oct 2021 04:30) (96 - 102)  BP: 155/84 (31 Oct 2021 04:30) (128/75 - 155/84)  RR: 18 (31 Oct 2021 04:30) (18 - 18)  SpO2: 95% (31 Oct 2021 04:30) (93% - 95%)      LABS:                        8.5    11.66 )-----------( 440      ( 31 Oct 2021 07:28 )             27.6     10-31    138  |  98  |  6<L>  ----------------------------<  132<H>  3.5   |  33<H>  |  0.49<L>    Ca    9.5      31 Oct 2021 07:28      PHYSICAL EXAM:  General: NAD, resting comfortably in bed.   RLE:   Dressing C/D/I.   +TA/EHL/FHL/GSC.   SILT L2-S1.   + DP.   Compartments soft and compressible.   Calves non-TTP bilaterally.   Abduction pillow in place.                ASSESSMENT:  71F s/p R hemiarthroplasty POD 4; Stable.     - Pain control PRN.   - PT/OT.    - WBAT on the RLE.   - DVT ppx Lovenox.   - FU AM Labs.   - Posterior hip precautions.   - Abduction pillow while in bed / in chair.   - Rest, ice, the extremity as we needed.   - Incentive Spirometry.   - Medical management appreciated.   - Dispo MAGALIE Per PT recommendations.   - No further acute orthopaedic surgical intervention.   - Will discuss with Dr. Segovia and advise of any changes to the above plan. 
Patient is a 71y old  Female who presents with a chief complaint of fall (30 Oct 2021 07:38)      INTERVAL HPI/OVERNIGHT EVENTS: Patient seen and examined. NAD. No complaints.    Vital Signs Last 24 Hrs  T(C): 37.2 (31 Oct 2021 04:30), Max: 37.4 (30 Oct 2021 20:46)  T(F): 98.9 (31 Oct 2021 04:30), Max: 99.3 (30 Oct 2021 20:46)  HR: 98 (31 Oct 2021 04:30) (96 - 102)  BP: 155/84 (31 Oct 2021 04:30) (128/75 - 155/84)  BP(mean): --  RR: 18 (31 Oct 2021 04:30) (18 - 18)  SpO2: 95% (31 Oct 2021 04:30) (93% - 95%)    10-31    138  |  98  |  6<L>  ----------------------------<  132<H>  3.5   |  33<H>  |  0.49<L>    Ca    9.5      31 Oct 2021 07:28                            8.5    11.66 )-----------( 440      ( 31 Oct 2021 07:28 )             27.6       CAPILLARY BLOOD GLUCOSE                  acetaminophen     Tablet .. 650 milliGRAM(s) Oral every 6 hours PRN  artificial tears (preservative free) Ophthalmic Solution 1 Drop(s) Both EYES four times a day PRN  bisacodyl Suppository 10 milliGRAM(s) Rectal once PRN  calcium carbonate 1250 mG  + Vitamin D (OsCal 500 + D) 1 Tablet(s) Oral three times a day  clonazePAM  Tablet 1 milliGRAM(s) Oral at bedtime PRN  enoxaparin Injectable 40 milliGRAM(s) SubCutaneous daily  folic acid 1 milliGRAM(s) Oral daily  LORazepam   Injectable 1 milliGRAM(s) IV Push every 1 hour PRN  losartan 25 milliGRAM(s) Oral daily  magnesium hydroxide Suspension 30 milliLiter(s) Oral daily PRN  melatonin 3 milliGRAM(s) Oral at bedtime PRN  multivitamin 1 Tablet(s) Oral daily  ondansetron Injectable 4 milliGRAM(s) IV Push every 6 hours PRN  oxyCODONE    IR 5 milliGRAM(s) Oral every 4 hours PRN  oxyCODONE    IR 10 milliGRAM(s) Oral every 4 hours PRN  pantoprazole    Tablet 40 milliGRAM(s) Oral before breakfast  polyethylene glycol 3350 17 Gram(s) Oral at bedtime  senna 2 Tablet(s) Oral at bedtime              REVIEW OF SYSTEMS:  CONSTITUTIONAL: No fever, no weight loss, or no fatigue  NECK: No pain, no stiffness  RESPIRATORY: No cough, no wheezing, no chills, no hemoptysis, No shortness of breath  CARDIOVASCULAR: No chest pain, no palpitations, no dizziness, no leg swelling  GASTROINTESTINAL: No abdominal pain. No nausea, no vomiting, no hematemesis; No diarrhea, no constipation. No melena, no hematochezia.  GENITOURINARY: No dysuria, no frequency, no hematuria, no incontinence  NEUROLOGICAL: No headaches, no loss of strength, no numbness, no tremors  SKIN: No itching, no burning  MUSCULOSKELETAL: No joint pain, no swelling; No muscle, no back, no extremity pain  PSYCHIATRIC: No depression, no mood swings,   HEME/LYMPH: No easy bruising, no bleeding gums  ALLERY AND IMMUNOLOGIC: No hives       Consultant(s) Notes Reviewed:  [X] YES  [ ] NO    PHYSICAL EXAM:  GENERAL: NAD  HEAD:  Atraumatic, Normocephalic  EYES: EOMI, PERRLA, conjunctiva and sclera clear  ENMT: No tonsillar erythema, exudates, or enlargement; Moist mucous membranes  NECK: Supple, No JVD  NERVOUS SYSTEM:  Awake & alert  CHEST/LUNG: Clear to auscultation bilaterally; No rales, rhonchi, wheezing,  HEART: Regular rate and rhythm  ABDOMEN: Soft, Nontender, Nondistended; Bowel sounds present  EXTREMITIES:  No clubbing, cyanosis, or edema  LYMPH: No lymphadenopathy noted  SKIN: No rashes      Advanced care planning discussed with patient/family [X] YES   [ ] NO    Advanced care planning discussed with patient/family. Patient's health status was discussed. All appropriate changes have been made regarding patient's end-of-life care. Advanced care planning forms reviewed/discussed/completed.  20 minutes spent.   
  The patient was interviewed and evaluated  71y Female s/p right hip hemiarthroplasty with GA    T(C): 36.6 (10-28-21 @ 04:52), Max: 38.2 (10-27-21 @ 12:05)  HR: 118 (10-28-21 @ 09:19) (74 - 118)  BP: 164/84 (10-28-21 @ 09:19) (113/77 - 165/83)  RR: 16 (10-28-21 @ 04:52) (14 - 18)  SpO2: 99% (10-28-21 @ 09:19) (92% - 100%)  Wt(kg): --    Pt seen, doing well, no anesthesia complications or complaints noted or reported.   No Nausea    No additional recommendations.     
Chief Complaint: Hip fracture    Interval Events: No events overnight.    Review of Systems:  General: No fevers, chills, weight loss or gain  Skin: No rashes, color changes  Cardiovascular: No chest pain, orthopnea  Respiratory: No shortness of breath, cough  Gastrointestinal: No nausea, abdominal pain  Genitourinary: No incontinence, pain with urination  Musculoskeletal: No pain, swelling, decreased range of motion  Neurological: No headache, weakness  Psychiatric: No depression, anxiety  Endocrine: No weight loss or gain, increased thirst  All other systems are comprehensively negative.    Physical Exam:  Vitals:        Vital Signs Last 24 Hrs  T(C): 36.6 (28 Oct 2021 04:52), Max: 38.2 (27 Oct 2021 12:05)  T(F): 97.9 (28 Oct 2021 04:52), Max: 100.7 (27 Oct 2021 12:05)  HR: 90 (28 Oct 2021 04:52) (74 - 104)  BP: 126/71 (28 Oct 2021 04:52) (113/77 - 165/83)  BP(mean): --  RR: 16 (28 Oct 2021 04:52) (14 - 18)  SpO2: 98% (28 Oct 2021 04:52) (92% - 100%)  General: NAD  HEENT: MMM  Neck: No JVD, no carotid bruit  Lungs: CTAB  CV: RRR, nl S1/S2, no M/R/G  Abdomen: S/NT/ND, +BS  Extremities: No LE edema, no cyanosis  Neuro: AAOx3, non-focal  Skin: No rash    Labs:                        10.6   20.98 )-----------( 512      ( 27 Oct 2021 19:47 )             34.5     10-28    142  |  105  |  10  ----------------------------<  141<H>  4.0   |  30  |  0.67    Ca    9.2      28 Oct 2021 00:25  Phos  4.7     10-28  Mg     2.2     10-28    TPro  7.0  /  Alb  2.7<L>  /  TBili  0.4  /  DBili  .10  /  AST  17  /  ALT  15  /  AlkPhos  116  10-28        PT/INR - ( 27 Oct 2021 09:30 )   PT: 13.3 sec;   INR: 1.14 ratio         PTT - ( 27 Oct 2021 09:30 )  PTT:30.4 sec    Telemetry: Sinus rhythm
Chief Complaint: Hip fracture    Interval Events: No events overnight.    Review of Systems:  General: No fevers, chills, weight loss or gain  Skin: No rashes, color changes  Cardiovascular: No chest pain, orthopnea  Respiratory: No shortness of breath, cough  Gastrointestinal: No nausea, abdominal pain  Genitourinary: No incontinence, pain with urination  Musculoskeletal: No pain, swelling, decreased range of motion  Neurological: No headache, weakness  Psychiatric: No depression, anxiety  Endocrine: No weight loss or gain, increased thirst  All other systems are comprehensively negative.    Physical Exam:  Vitals:        Vital Signs Last 24 Hrs  T(C): 36.6 (28 Oct 2021 04:52), Max: 38.2 (27 Oct 2021 12:05)  T(F): 97.9 (28 Oct 2021 04:52), Max: 100.7 (27 Oct 2021 12:05)  HR: 90 (28 Oct 2021 04:52) (74 - 104)  BP: 126/71 (28 Oct 2021 04:52) (113/77 - 165/83)  BP(mean): --  RR: 16 (28 Oct 2021 04:52) (14 - 18)  SpO2: 98% (28 Oct 2021 04:52) (92% - 100%)  General: NAD  HEENT: MMM  Neck: No JVD, no carotid bruit  Lungs: CTAB  CV: RRR, nl S1/S2, no M/R/G  Abdomen: S/NT/ND, +BS  Extremities: No LE edema, no cyanosis  Neuro: AAOx3, non-focal  Skin: No rash    Labs:                        10.6   20.98 )-----------( 512      ( 27 Oct 2021 19:47 )             34.5     10-28    142  |  105  |  10  ----------------------------<  141<H>  4.0   |  30  |  0.67    Ca    9.2      28 Oct 2021 00:25  Phos  4.7     10-28  Mg     2.2     10-28    TPro  7.0  /  Alb  2.7<L>  /  TBili  0.4  /  DBili  .10  /  AST  17  /  ALT  15  /  AlkPhos  116  10-28        PT/INR - ( 27 Oct 2021 09:30 )   PT: 13.3 sec;   INR: 1.14 ratio         PTT - ( 27 Oct 2021 09:30 )  PTT:30.4 sec    Telemetry: Sinus rhythm, brief AT
Chief Complaint: Hip fracture    Interval Events: No events overnight. Sleeping.    Review of Systems:  General: No fevers, chills, weight loss or gain  Skin: No rashes, color changes  Cardiovascular: No chest pain, orthopnea  Respiratory: No shortness of breath, cough  Gastrointestinal: No nausea, abdominal pain  Genitourinary: No incontinence, pain with urination  Musculoskeletal: No pain, swelling, decreased range of motion  Neurological: No headache, weakness  Psychiatric: No depression, anxiety  Endocrine: No weight loss or gain, increased thirst  All other systems are comprehensively negative.    Physical Exam:  Vital Signs Last 24 Hrs  T(C): 37.1 (01 Nov 2021 08:55), Max: 37.2 (01 Nov 2021 04:30)  T(F): 98.8 (01 Nov 2021 08:55), Max: 99 (01 Nov 2021 04:30)  HR: 81 (01 Nov 2021 08:55) (81 - 100)  BP: 145/78 (01 Nov 2021 08:55) (131/76 - 145/78)  BP(mean): --  RR: 19 (01 Nov 2021 08:55) (18 - 19)  SpO2: 93% (01 Nov 2021 08:55) (93% - 93%)  General: NAD  HEENT: MMM  Neck: No JVD, no carotid bruit  Lungs: CTAB  CV: RRR, nl S1/S2, no M/R/G  Abdomen: S/NT/ND, +BS  Extremities: No LE edema, no cyanosis  Neuro: AAOx3, non-focal  Skin: No rash    Labs:    11-01    137  |  97  |  6<L>  ----------------------------<  115<H>  3.6   |  33<H>  |  0.50    Ca    9.5      01 Nov 2021 08:46                          8.5    14.31 )-----------( 509      ( 01 Nov 2021 08:46 )             27.6     
Date/Time Patient Seen:  		  Referring MD:   Data Reviewed	       Patient is a 71y old  Female who presents with a chief complaint of fall (29 Oct 2021 13:31)      Subjective/HPI     PAST MEDICAL & SURGICAL HISTORY:  Hypertension    Anxiety    IBS (irritable bowel syndrome)    Migraine    S/P tonsillectomy          Medication list         MEDICATIONS  (STANDING):  calcium carbonate 1250 mG  + Vitamin D (OsCal 500 + D) 1 Tablet(s) Oral three times a day  enoxaparin Injectable 40 milliGRAM(s) SubCutaneous daily  folic acid 1 milliGRAM(s) Oral daily  losartan 25 milliGRAM(s) Oral daily  multivitamin 1 Tablet(s) Oral daily  pantoprazole    Tablet 40 milliGRAM(s) Oral before breakfast  polyethylene glycol 3350 17 Gram(s) Oral at bedtime  senna 2 Tablet(s) Oral at bedtime  thiamine 100 milliGRAM(s) Oral daily    MEDICATIONS  (PRN):  acetaminophen     Tablet .. 650 milliGRAM(s) Oral every 6 hours PRN Temp greater or equal to 38C (100.4F), Mild Pain (1 - 3)  bisacodyl Suppository 10 milliGRAM(s) Rectal once PRN Constipation  clonazePAM  Tablet 1 milliGRAM(s) Oral at bedtime PRN anxiety/insomnia  LORazepam   Injectable 1 milliGRAM(s) IV Push every 1 hour PRN CIWA-Ar score 8 or greater  magnesium hydroxide Suspension 30 milliLiter(s) Oral daily PRN Constipation  melatonin 3 milliGRAM(s) Oral at bedtime PRN Insomnia  ondansetron Injectable 4 milliGRAM(s) IV Push every 6 hours PRN Nausea and/or Vomiting  oxyCODONE    IR 5 milliGRAM(s) Oral every 4 hours PRN Moderate Pain (4 - 6)  oxyCODONE    IR 10 milliGRAM(s) Oral every 4 hours PRN Severe Pain (7 - 10)         Vitals log        ICU Vital Signs Last 24 Hrs  T(C): 37.4 (30 Oct 2021 04:25), Max: 37.7 (29 Oct 2021 17:37)  T(F): 99.3 (30 Oct 2021 04:25), Max: 99.9 (29 Oct 2021 17:37)  HR: 100 (30 Oct 2021 04:25) (100 - 122)  BP: 130/74 (30 Oct 2021 04:25) (111/73 - 134/81)  BP(mean): --  ABP: --  ABP(mean): --  RR: 18 (30 Oct 2021 04:25) (18 - 18)  SpO2: 92% (30 Oct 2021 04:25) (92% - 96%)           Input and Output:  I&O's Detail    29 Oct 2021 07:01  -  30 Oct 2021 07:00  --------------------------------------------------------  IN:  Total IN: 0 mL    OUT:    Voided (mL): 450 mL  Total OUT: 450 mL    Total NET: -450 mL          Lab Data                        8.4    14.02 )-----------( 427      ( 29 Oct 2021 07:44 )             27.3     10-29    140  |  101  |  7   ----------------------------<  160<H>  3.8   |  33<H>  |  0.68    Ca    9.0      29 Oct 2021 07:48  Phos  2.6     10-29  Mg     2.3     10-29              Review of Systems	      Objective     Physical Examination    heart s1s2  lung dec BS  abd soft      Pertinent Lab findings & Imaging      Joshua:  NO   Adequate UO     I&O's Detail    29 Oct 2021 07:01  -  30 Oct 2021 07:00  --------------------------------------------------------  IN:  Total IN: 0 mL    OUT:    Voided (mL): 450 mL  Total OUT: 450 mL    Total NET: -450 mL               Discussed with:     Cultures:	        Radiology                            
Date/Time Patient Seen:  		  Referring MD:   Data Reviewed	       Patient is a 71y old  Female who presents with a chief complaint of fall (30 Oct 2021 14:44)      Subjective/HPI     PAST MEDICAL & SURGICAL HISTORY:  Hypertension    Anxiety    IBS (irritable bowel syndrome)    Migraine    S/P tonsillectomy          Medication list         MEDICATIONS  (STANDING):  calcium carbonate 1250 mG  + Vitamin D (OsCal 500 + D) 1 Tablet(s) Oral three times a day  enoxaparin Injectable 40 milliGRAM(s) SubCutaneous daily  folic acid 1 milliGRAM(s) Oral daily  losartan 25 milliGRAM(s) Oral daily  multivitamin 1 Tablet(s) Oral daily  pantoprazole    Tablet 40 milliGRAM(s) Oral before breakfast  polyethylene glycol 3350 17 Gram(s) Oral at bedtime  senna 2 Tablet(s) Oral at bedtime    MEDICATIONS  (PRN):  acetaminophen     Tablet .. 650 milliGRAM(s) Oral every 6 hours PRN Temp greater or equal to 38C (100.4F), Mild Pain (1 - 3)  artificial tears (preservative free) Ophthalmic Solution 1 Drop(s) Both EYES four times a day PRN Dry Eyes  bisacodyl Suppository 10 milliGRAM(s) Rectal once PRN Constipation  clonazePAM  Tablet 1 milliGRAM(s) Oral at bedtime PRN anxiety/insomnia  LORazepam   Injectable 1 milliGRAM(s) IV Push every 1 hour PRN CIWA-Ar score 8 or greater  magnesium hydroxide Suspension 30 milliLiter(s) Oral daily PRN Constipation  melatonin 3 milliGRAM(s) Oral at bedtime PRN Insomnia  ondansetron Injectable 4 milliGRAM(s) IV Push every 6 hours PRN Nausea and/or Vomiting  oxyCODONE    IR 5 milliGRAM(s) Oral every 4 hours PRN Moderate Pain (4 - 6)  oxyCODONE    IR 10 milliGRAM(s) Oral every 4 hours PRN Severe Pain (7 - 10)         Vitals log        ICU Vital Signs Last 24 Hrs  T(C): 37.2 (31 Oct 2021 04:30), Max: 37.4 (30 Oct 2021 20:46)  T(F): 98.9 (31 Oct 2021 04:30), Max: 99.3 (30 Oct 2021 20:46)  HR: 98 (31 Oct 2021 04:30) (96 - 102)  BP: 155/84 (31 Oct 2021 04:30) (128/75 - 155/84)  BP(mean): --  ABP: --  ABP(mean): --  RR: 18 (31 Oct 2021 04:30) (18 - 18)  SpO2: 95% (31 Oct 2021 04:30) (93% - 95%)           Input and Output:  I&O's Detail    29 Oct 2021 07:01  -  30 Oct 2021 07:00  --------------------------------------------------------  IN:  Total IN: 0 mL    OUT:    Voided (mL): 450 mL  Total OUT: 450 mL    Total NET: -450 mL      30 Oct 2021 07:01  -  31 Oct 2021 06:13  --------------------------------------------------------  IN:  Total IN: 0 mL    OUT:    Voided (mL): 500 mL  Total OUT: 500 mL    Total NET: -500 mL          Lab Data                        8.5    13.16 )-----------( 433      ( 30 Oct 2021 07:53 )             28.0     10-30    139  |  100  |  7   ----------------------------<  157<H>  3.9   |  34<H>  |  0.56    Ca    9.2      30 Oct 2021 07:53  Phos  2.6     10-29  Mg     2.3     10-29              Review of Systems	      Objective     Physical Examination    heart s1s2  lung dec BS  abd soft      Pertinent Lab findings & Imaging      Joshua:  NO   Adequate UO     I&O's Detail    29 Oct 2021 07:01  -  30 Oct 2021 07:00  --------------------------------------------------------  IN:  Total IN: 0 mL    OUT:    Voided (mL): 450 mL  Total OUT: 450 mL    Total NET: -450 mL      30 Oct 2021 07:01  -  31 Oct 2021 06:13  --------------------------------------------------------  IN:  Total IN: 0 mL    OUT:    Voided (mL): 500 mL  Total OUT: 500 mL    Total NET: -500 mL               Discussed with:     Cultures:	        Radiology                            
Date/Time Patient Seen:  		  Referring MD:   Data Reviewed	       Patient is a 71y old  Female who presents with a chief complaint of fall (31 Oct 2021 08:41)      Subjective/HPI     PAST MEDICAL & SURGICAL HISTORY:  Hypertension    Anxiety    IBS (irritable bowel syndrome)    Migraine    S/P tonsillectomy          Medication list         MEDICATIONS  (STANDING):  calcium carbonate 1250 mG  + Vitamin D (OsCal 500 + D) 1 Tablet(s) Oral three times a day  enoxaparin Injectable 40 milliGRAM(s) SubCutaneous daily  folic acid 1 milliGRAM(s) Oral daily  losartan 25 milliGRAM(s) Oral daily  multivitamin 1 Tablet(s) Oral daily  pantoprazole    Tablet 40 milliGRAM(s) Oral before breakfast  polyethylene glycol 3350 17 Gram(s) Oral at bedtime  senna 2 Tablet(s) Oral at bedtime    MEDICATIONS  (PRN):  acetaminophen     Tablet .. 650 milliGRAM(s) Oral every 6 hours PRN Temp greater or equal to 38C (100.4F), Mild Pain (1 - 3)  artificial tears (preservative free) Ophthalmic Solution 1 Drop(s) Both EYES four times a day PRN Dry Eyes  bisacodyl Suppository 10 milliGRAM(s) Rectal once PRN Constipation  clonazePAM  Tablet 1 milliGRAM(s) Oral at bedtime PRN anxiety/insomnia  LORazepam   Injectable 1 milliGRAM(s) IV Push every 1 hour PRN CIWA-Ar score 8 or greater  magnesium hydroxide Suspension 30 milliLiter(s) Oral daily PRN Constipation  melatonin 3 milliGRAM(s) Oral at bedtime PRN Insomnia  ondansetron Injectable 4 milliGRAM(s) IV Push every 6 hours PRN Nausea and/or Vomiting  oxyCODONE    IR 5 milliGRAM(s) Oral every 4 hours PRN Moderate Pain (4 - 6)  oxyCODONE    IR 10 milliGRAM(s) Oral every 4 hours PRN Severe Pain (7 - 10)         Vitals log        ICU Vital Signs Last 24 Hrs  T(C): 37.2 (01 Nov 2021 04:30), Max: 37.2 (01 Nov 2021 04:30)  T(F): 99 (01 Nov 2021 04:30), Max: 99 (01 Nov 2021 04:30)  HR: 82 (01 Nov 2021 04:30) (82 - 100)  BP: 135/73 (01 Nov 2021 04:30) (131/76 - 135/73)  BP(mean): --  ABP: --  ABP(mean): --  RR: 18 (01 Nov 2021 04:30) (18 - 18)  SpO2: 93% (01 Nov 2021 04:30) (93% - 93%)           Input and Output:  I&O's Detail    30 Oct 2021 07:01  -  31 Oct 2021 07:00  --------------------------------------------------------  IN:  Total IN: 0 mL    OUT:    Voided (mL): 500 mL  Total OUT: 500 mL    Total NET: -500 mL          Lab Data                        8.5    11.66 )-----------( 440      ( 31 Oct 2021 07:28 )             27.6     10-31    138  |  98  |  6<L>  ----------------------------<  132<H>  3.5   |  33<H>  |  0.49<L>    Ca    9.5      31 Oct 2021 07:28              Review of Systems	      Objective     Physical Examination    heart s1s2  lung dec BS  abd soft      Pertinent Lab findings & Imaging      Joshua:  NO   Adequate UO     I&O's Detail    30 Oct 2021 07:01  -  31 Oct 2021 07:00  --------------------------------------------------------  IN:  Total IN: 0 mL    OUT:    Voided (mL): 500 mL  Total OUT: 500 mL    Total NET: -500 mL               Discussed with:     Cultures:	        Radiology                            
Patient examined at bedside this AM. Pain well-controlled with medications. Denies any acute complaints. No overnight events. Denies numbness/tingling of the b/l LE. Denies chest pain, shortness of breath, nausea, vomiting, lightheadedness, headache.       VITAL SIGNS:  T(C): 37.2 (11-01-21 @ 04:30), Max: 37.2 (11-01-21 @ 04:30)  HR: 82 (11-01-21 @ 04:30) (82 - 100)  BP: 135/73 (11-01-21 @ 04:30) (131/76 - 135/73)  RR: 18 (11-01-21 @ 04:30) (18 - 18)  SpO2: 93% (11-01-21 @ 04:30) (93% - 93%)      LABS:                        8.5    11.66 )-----------( 440      ( 31 Oct 2021 07:28 )             27.6     10-31    138  |  98  |  6<L>  ----------------------------<  132<H>  3.5   |  33<H>  |  0.49<L>    Ca    9.5      31 Oct 2021 07:28            PHYSICAL EXAM:  General: NAD, resting comfortably in bed  Abduction pillow in place  RLE:   Dressing C/D/I  +TA/EHL/FHL/GSC  SILT L2-S1  + DP  Compartments soft and compressible  Calves non-TTP bilaterally                 ASSESSMENT:  71F s/p R hemiarthroplasty POD 5     - Pain control PRN.   - PT/OT.    - WBAT on the RLE.   - DVT ppx Lovenox.   - FU AM Labs.   - Posterior hip precautions.   - Abduction pillow while in bed / in chair.   - Rest, ice, the extremity as we needed.   - Incentive Spirometry.   - Medical management appreciated.   - Dispo MAGALIE Per PT recommendations.   - No further acute orthopaedic surgical intervention  - Orthopaedically stable for d/c  - Will discuss with Dr. Segovia and advise of any changes to the above plan. 
Patient seen and examined at bedside this AM. No acute complaints at this time. Pain well controlled. Denies chest pain, shortness of breath, nausea or vomiting, numbness/tingling b/l LE    LABS:                        8.4    14.02 )-----------( 427      ( 29 Oct 2021 07:44 )             27.3     10-29    140  |  101  |  7   ----------------------------<  160<H>  3.8   |  33<H>  |  0.68    Ca    9.0      29 Oct 2021 07:48  Phos  2.6     10-29  Mg     2.3     10-29      PT/INR - ( 28 Oct 2021 07:48 )   PT: 13.9 sec;   INR: 1.20 ratio         PTT - ( 28 Oct 2021 07:48 )  PTT:29.6 sec      VITAL SIGNS:  T(C): 37.4 (10-30-21 @ 04:25), Max: 37.7 (10-29-21 @ 17:37)  HR: 100 (10-30-21 @ 04:25) (100 - 122)  BP: 130/74 (10-30-21 @ 04:25) (111/73 - 134/81)  RR: 18 (10-30-21 @ 04:25) (18 - 18)  SpO2: 92% (10-30-21 @ 04:25) (92% - 96%)    General: NAD, resting comfortably in bed  RLE:   mendoza removed for trial of void  Dressing C/D/I  SCDs present bilaterally  Compartments soft and compressible  No calf tenderness bilaterally  +TA/EHL/FHL/GSC  SILT L3-S1  + DP  Abduction pillow in place                 A/P:  71y f s/p R hemiarthroplasty POD 3    -PT/OT   -WBAT on the RLE  -Pain Control PRN  -DVT ppx w/ Lovenox  -FU AM Labs  -Posterior hip precautions  -Abduction pillow while in bed / in chair  -Rest, ice, the extremity as we needed  -Incentive Spirometry  -Medical management appreciated  -Dispo MAGALIE Per PT  -No further acute orthopaedic surgical intervention
Patient seen and examined at bedside this AM. No acute complaints at this time. Pain well controlled. Denies chest pain, shortness of breath, nausea or vomiting. Overnight patient had 16 beats of V-tach, overnight resident evaluated the patient and documented that patient was asymptomatic. Mg / Phos ordered and wnl.     PE:  Vital Signs Last 24 Hrs  T(C): 36.6 (10-28-21 @ 04:52), Max: 38.2 (10-27-21 @ 12:05)  T(F): 97.9 (10-28-21 @ 04:52), Max: 100.7 (10-27-21 @ 12:05)  HR: 90 (10-28-21 @ 04:52) (74 - 104)  BP: 126/71 (10-28-21 @ 04:52) (113/77 - 165/83)  BP(mean): --  RR: 16 (10-28-21 @ 04:52) (14 - 18)  SpO2: 98% (10-28-21 @ 04:52) (92% - 100%)    General: NAD, resting comfortably in bed  RLE:   mendoza removed for trial of void  Dressing C/D/I  SCDs present bilaterally  Compartments soft and compressible  No calf tenderness bilaterally  +TA/EHL/FHL/GSC  SILT L3-S1  + DP  Abduction pillow in place                          10.6   20.98 )-----------( 512      ( 27 Oct 2021 19:47 )             34.5     28 Oct 2021 00:25    142    |  105    |  10     ----------------------------<  141    4.0     |  30     |  0.67     Ca    9.2        28 Oct 2021 00:25  Phos  4.7       28 Oct 2021 00:25  Mg     2.2       28 Oct 2021 00:25    TPro  7.0    /  Alb  2.7    /  TBili  0.4    /  DBili  .10    /  AST  17     /  ALT  15     /  AlkPhos  116    28 Oct 2021 00:25    PT/INR - ( 27 Oct 2021 09:30 )   PT: 13.3 sec;   INR: 1.14 ratio         PTT - ( 27 Oct 2021 09:30 )  PTT:30.4 sec    A/P:  71y f s/p R hemiarthroplasty POD 2    -PT/OT   -WBAT on the RLE  -Pain Control PRN  -DVT ppx w/ Lovenox  -FU AM Labs  -Posterior hip precautions  -Abduction pillow while in bed / in chair  -Rest, ice, the extremity as we needed  -Incentive Spirometry  -Medical management appreciated  -Dispo MAGALIE Per PT  -No further acute orthopaedic surgical intervention
Patient tolerated the procedure well. Patient seen and examined at bedside this AM. No acute complaints at this time. Pain well controlled. Denies chest pain, shortness of breath, nausea or vomiting.     PE:  Vital Signs Last 24 Hrs  T(C): 36.6 (10-28-21 @ 04:52), Max: 38.2 (10-27-21 @ 12:05)  T(F): 97.9 (10-28-21 @ 04:52), Max: 100.7 (10-27-21 @ 12:05)  HR: 90 (10-28-21 @ 04:52) (74 - 104)  BP: 126/71 (10-28-21 @ 04:52) (113/77 - 165/83)  BP(mean): --  RR: 16 (10-28-21 @ 04:52) (14 - 18)  SpO2: 98% (10-28-21 @ 04:52) (92% - 100%)    General: NAD, resting comfortably in bed  RLE:   Dressing C/D/I  SCDs present bilaterally  Compartments soft and compressible  No calf tenderness bilaterally  +TA/EHL/FHL/GSC  SILT L3-S1  + DP  Abduction pillow in place                          10.6   20.98 )-----------( 512      ( 27 Oct 2021 19:47 )             34.5     28 Oct 2021 00:25    142    |  105    |  10     ----------------------------<  141    4.0     |  30     |  0.67     Ca    9.2        28 Oct 2021 00:25  Phos  4.7       28 Oct 2021 00:25  Mg     2.2       28 Oct 2021 00:25    TPro  7.0    /  Alb  2.7    /  TBili  0.4    /  DBili  .10    /  AST  17     /  ALT  15     /  AlkPhos  116    28 Oct 2021 00:25    PT/INR - ( 27 Oct 2021 09:30 )   PT: 13.3 sec;   INR: 1.14 ratio         PTT - ( 27 Oct 2021 09:30 )  PTT:30.4 sec    A/P:  71y f s/p R hemiarthroplasty POD 1    -PT/OT   -WBAT on the RLE  -Pain Control PRN  -DVT ppx w/ Lovenox  -FU AM Labs  -Posterior hip precautions  -Abduction pillow while in bed / in chair  -Rest, ice, compress and elevate the extremity as we needed  -Incentive Spirometry  -Medical management appreciated  -Dispo pending PT recs
Date/Time Patient Seen:  		  Referring MD:   Data Reviewed	       Patient is a 71y old  Female who presents with a chief complaint of fall (27 Oct 2021 19:14)      Subjective/HPI     PAST MEDICAL & SURGICAL HISTORY:  Hypertension    Anxiety    IBS (irritable bowel syndrome)    Migraine    S/P tonsillectomy          Medication list         MEDICATIONS  (STANDING):  calcium carbonate 1250 mG  + Vitamin D (OsCal 500 + D) 1 Tablet(s) Oral three times a day  ceFAZolin   IVPB 2000 milliGRAM(s) IV Intermittent every 8 hours  enoxaparin Injectable 40 milliGRAM(s) SubCutaneous daily  folic acid 1 milliGRAM(s) Oral daily  losartan 25 milliGRAM(s) Oral daily  multivitamin 1 Tablet(s) Oral daily  pantoprazole    Tablet 40 milliGRAM(s) Oral before breakfast  polyethylene glycol 3350 17 Gram(s) Oral at bedtime  senna 2 Tablet(s) Oral at bedtime  thiamine 100 milliGRAM(s) Oral daily    MEDICATIONS  (PRN):  acetaminophen     Tablet .. 650 milliGRAM(s) Oral every 6 hours PRN Temp greater or equal to 38C (100.4F), Mild Pain (1 - 3)  clonazePAM  Tablet 1 milliGRAM(s) Oral at bedtime PRN anxiety/insomnia  LORazepam   Injectable 1 milliGRAM(s) IV Push every 1 hour PRN CIWA-Ar score 8 or greater  magnesium hydroxide Suspension 30 milliLiter(s) Oral daily PRN Constipation  melatonin 3 milliGRAM(s) Oral at bedtime PRN Insomnia  ondansetron Injectable 4 milliGRAM(s) IV Push every 6 hours PRN Nausea and/or Vomiting  oxyCODONE    IR 5 milliGRAM(s) Oral every 4 hours PRN Moderate Pain (4 - 6)  oxyCODONE    IR 10 milliGRAM(s) Oral every 4 hours PRN Severe Pain (7 - 10)         Vitals log        ICU Vital Signs Last 24 Hrs  T(C): 36.6 (28 Oct 2021 04:52), Max: 38.2 (27 Oct 2021 12:05)  T(F): 97.9 (28 Oct 2021 04:52), Max: 100.7 (27 Oct 2021 12:05)  HR: 90 (28 Oct 2021 04:52) (74 - 104)  BP: 126/71 (28 Oct 2021 04:52) (113/77 - 165/83)  BP(mean): --  ABP: --  ABP(mean): --  RR: 16 (28 Oct 2021 04:52) (14 - 18)  SpO2: 98% (28 Oct 2021 04:52) (92% - 100%)           Input and Output:  I&O's Detail    27 Oct 2021 07:01  -  28 Oct 2021 06:41  --------------------------------------------------------  IN:    Lactated Ringers: 150 mL  Total IN: 150 mL    OUT:    Indwelling Catheter - Urethral (mL): 100 mL    Voided (mL): 1000 mL  Total OUT: 1100 mL    Total NET: -950 mL          Lab Data                        10.6   20.98 )-----------( 512      ( 27 Oct 2021 19:47 )             34.5     10-28    142  |  105  |  10  ----------------------------<  141<H>  4.0   |  30  |  0.67    Ca    9.2      28 Oct 2021 00:25  Phos  4.7     10-28  Mg     2.2     10-28    TPro  7.0  /  Alb  2.7<L>  /  TBili  0.4  /  DBili  .10  /  AST  17  /  ALT  15  /  AlkPhos  116  10-28            Review of Systems	      Objective     Physical Examination    heart s1s2  lung dec BS  abd soft      Pertinent Lab findings & Imaging      Joshua:  NO   Adequate UO     I&O's Detail    27 Oct 2021 07:01  -  28 Oct 2021 06:41  --------------------------------------------------------  IN:    Lactated Ringers: 150 mL  Total IN: 150 mL    OUT:    Indwelling Catheter - Urethral (mL): 100 mL    Voided (mL): 1000 mL  Total OUT: 1100 mL    Total NET: -950 mL               Discussed with:     Cultures:	        Radiology                            
Date/Time Patient Seen:  		  Referring MD:   Data Reviewed	       Patient is a 71y old  Female who presents with a chief complaint of fall (29 Oct 2021 07:02)      Subjective/HPI     PAST MEDICAL & SURGICAL HISTORY:  Hypertension    Anxiety    IBS (irritable bowel syndrome)    Migraine    S/P tonsillectomy          Medication list         MEDICATIONS  (STANDING):  calcium carbonate 1250 mG  + Vitamin D (OsCal 500 + D) 1 Tablet(s) Oral three times a day  enoxaparin Injectable 40 milliGRAM(s) SubCutaneous daily  folic acid 1 milliGRAM(s) Oral daily  losartan 25 milliGRAM(s) Oral daily  multivitamin 1 Tablet(s) Oral daily  pantoprazole    Tablet 40 milliGRAM(s) Oral before breakfast  polyethylene glycol 3350 17 Gram(s) Oral at bedtime  senna 2 Tablet(s) Oral at bedtime  thiamine 100 milliGRAM(s) Oral daily    MEDICATIONS  (PRN):  acetaminophen     Tablet .. 650 milliGRAM(s) Oral every 6 hours PRN Temp greater or equal to 38C (100.4F), Mild Pain (1 - 3)  bisacodyl Suppository 10 milliGRAM(s) Rectal once PRN Constipation  clonazePAM  Tablet 1 milliGRAM(s) Oral at bedtime PRN anxiety/insomnia  LORazepam   Injectable 1 milliGRAM(s) IV Push every 1 hour PRN CIWA-Ar score 8 or greater  magnesium hydroxide Suspension 30 milliLiter(s) Oral daily PRN Constipation  melatonin 3 milliGRAM(s) Oral at bedtime PRN Insomnia  ondansetron Injectable 4 milliGRAM(s) IV Push every 6 hours PRN Nausea and/or Vomiting  oxyCODONE    IR 5 milliGRAM(s) Oral every 4 hours PRN Moderate Pain (4 - 6)  oxyCODONE    IR 10 milliGRAM(s) Oral every 4 hours PRN Severe Pain (7 - 10)         Vitals log        ICU Vital Signs Last 24 Hrs  T(C): 37 (29 Oct 2021 03:57), Max: 37.2 (28 Oct 2021 11:37)  T(F): 98.6 (29 Oct 2021 03:57), Max: 98.9 (28 Oct 2021 11:37)  HR: 107 (29 Oct 2021 03:57) (97 - 118)  BP: 134/73 (29 Oct 2021 03:57) (128/63 - 164/84)  BP(mean): --  ABP: --  ABP(mean): --  RR: 18 (29 Oct 2021 03:57) (17 - 18)  SpO2: 100% (29 Oct 2021 03:57) (99% - 100%)           Input and Output:  I&O's Detail    28 Oct 2021 07:01  -  29 Oct 2021 07:00  --------------------------------------------------------  IN:    Oral Fluid: 460 mL  Total IN: 460 mL    OUT:    Indwelling Catheter - Urethral (mL): 2000 mL  Total OUT: 2000 mL    Total NET: -1540 mL          Lab Data                        8.4    14.02 )-----------( 427      ( 29 Oct 2021 07:44 )             27.3     10-29    140  |  101  |  7   ----------------------------<  160<H>  3.8   |  33<H>  |  0.68    Ca    9.0      29 Oct 2021 07:48  Phos  2.6     10-29  Mg     2.3     10-29    TPro  7.0  /  Alb  2.7<L>  /  TBili  0.4  /  DBili  .10  /  AST  17  /  ALT  15  /  AlkPhos  116  10-28            Review of Systems	      Objective     Physical Examination    heart s1s2  lung dec BS  abd soft      Pertinent Lab findings & Imaging      Joshua:  NO   Adequate UO     I&O's Detail    28 Oct 2021 07:01  -  29 Oct 2021 07:00  --------------------------------------------------------  IN:    Oral Fluid: 460 mL  Total IN: 460 mL    OUT:    Indwelling Catheter - Urethral (mL): 2000 mL  Total OUT: 2000 mL    Total NET: -1540 mL               Discussed with:     Cultures:	        Radiology                            
Patient is a 71y old  Female who presents with a chief complaint of fall (30 Oct 2021 07:38)      INTERVAL HPI/OVERNIGHT EVENTS: Patient seen and examined. NAD. No complaints.    Vital Signs Last 24 Hrs  T(C): 37.2 (01 Nov 2021 11:52), Max: 37.2 (01 Nov 2021 04:30)  T(F): 99 (01 Nov 2021 11:52), Max: 99 (01 Nov 2021 04:30)  HR: 89 (01 Nov 2021 11:52) (81 - 89)  BP: 122/73 (01 Nov 2021 11:52) (122/73 - 145/78)  BP(mean): --  RR: 20 (01 Nov 2021 11:52) (18 - 20)  SpO2: 92% (01 Nov 2021 11:52) (92% - 93%)    11-01    137  |  97  |  6<L>  ----------------------------<  115<H>  3.6   |  33<H>  |  0.50    Ca    9.5      01 Nov 2021 08:46                            8.5    14.31 )-----------( 509      ( 01 Nov 2021 08:46 )             27.6       CAPILLARY BLOOD GLUCOSE                  acetaminophen     Tablet .. 650 milliGRAM(s) Oral every 6 hours PRN  artificial tears (preservative free) Ophthalmic Solution 1 Drop(s) Both EYES four times a day PRN  bisacodyl Suppository 10 milliGRAM(s) Rectal once PRN  calcium carbonate 1250 mG  + Vitamin D (OsCal 500 + D) 1 Tablet(s) Oral three times a day  clonazePAM  Tablet 1 milliGRAM(s) Oral at bedtime PRN  enoxaparin Injectable 40 milliGRAM(s) SubCutaneous daily  folic acid 1 milliGRAM(s) Oral daily  LORazepam   Injectable 1 milliGRAM(s) IV Push every 1 hour PRN  losartan 25 milliGRAM(s) Oral daily  magnesium hydroxide Suspension 30 milliLiter(s) Oral daily PRN  melatonin 3 milliGRAM(s) Oral at bedtime PRN  multivitamin 1 Tablet(s) Oral daily  ondansetron Injectable 4 milliGRAM(s) IV Push every 6 hours PRN  oxyCODONE    IR 5 milliGRAM(s) Oral every 4 hours PRN  oxyCODONE    IR 10 milliGRAM(s) Oral every 4 hours PRN  pantoprazole    Tablet 40 milliGRAM(s) Oral before breakfast  polyethylene glycol 3350 17 Gram(s) Oral at bedtime  senna 2 Tablet(s) Oral at bedtime              REVIEW OF SYSTEMS:  CONSTITUTIONAL: No fever, no weight loss, or no fatigue  NECK: No pain, no stiffness  RESPIRATORY: No cough, no wheezing, no chills, no hemoptysis, No shortness of breath  CARDIOVASCULAR: No chest pain, no palpitations, no dizziness, no leg swelling  GASTROINTESTINAL: No abdominal pain. No nausea, no vomiting, no hematemesis; No diarrhea, no constipation. No melena, no hematochezia.  GENITOURINARY: No dysuria, no frequency, no hematuria, no incontinence  NEUROLOGICAL: No headaches, no loss of strength, no numbness, no tremors  SKIN: No itching, no burning  MUSCULOSKELETAL: No joint pain, no swelling; No muscle, no back, no extremity pain  PSYCHIATRIC: No depression, no mood swings,   HEME/LYMPH: No easy bruising, no bleeding gums  ALLERY AND IMMUNOLOGIC: No hives       Consultant(s) Notes Reviewed:  [X] YES  [ ] NO    PHYSICAL EXAM:  GENERAL: NAD  HEAD:  Atraumatic, Normocephalic  EYES: EOMI, PERRLA, conjunctiva and sclera clear  ENMT: No tonsillar erythema, exudates, or enlargement; Moist mucous membranes  NECK: Supple, No JVD  NERVOUS SYSTEM:  Awake & alert  CHEST/LUNG: Clear to auscultation bilaterally; No rales, rhonchi, wheezing,  HEART: Regular rate and rhythm  ABDOMEN: Soft, Nontender, Nondistended; Bowel sounds present  EXTREMITIES:  No clubbing, cyanosis, or edema  LYMPH: No lymphadenopathy noted  SKIN: No rashes      Advanced care planning discussed with patient/family [X] YES   [ ] NO    Advanced care planning discussed with patient/family. Patient's health status was discussed. All appropriate changes have been made regarding patient's end-of-life care. Advanced care planning forms reviewed/discussed/completed.  20 minutes spent.   
Patient is a 71y old  Female who presents with a chief complaint of fall (28 Oct 2021 11:19)      INTERVAL HPI/OVERNIGHT EVENTS: Patient seen and examined. NAD. No complaints.    Vital Signs Last 24 Hrs  T(C): 37.2 (28 Oct 2021 11:37), Max: 37.6 (27 Oct 2021 16:58)  T(F): 98.9 (28 Oct 2021 11:37), Max: 99.7 (27 Oct 2021 16:58)  HR: 97 (28 Oct 2021 11:37) (74 - 118)  BP: 136/74 (28 Oct 2021 11:37) (113/77 - 165/83)  BP(mean): --  RR: 17 (28 Oct 2021 11:37) (14 - 18)  SpO2: 99% (28 Oct 2021 11:37) (92% - 100%)    10-28    141  |  103  |  11  ----------------------------<  170<H>  3.6   |  31  |  0.75    Ca    8.9      28 Oct 2021 07:48  Phos  4.1     10-28  Mg     2.4     10-28    TPro  7.0  /  Alb  2.7<L>  /  TBili  0.4  /  DBili  .10  /  AST  17  /  ALT  15  /  AlkPhos  116  10-28                          9.6    13.35 )-----------( 522      ( 28 Oct 2021 07:48 )             31.9     PT/INR - ( 28 Oct 2021 07:48 )   PT: 13.9 sec;   INR: 1.20 ratio         PTT - ( 28 Oct 2021 07:48 )  PTT:29.6 sec  CAPILLARY BLOOD GLUCOSE        Urinalysis Basic - ( 27 Oct 2021 02:13 )    Color: Yellow / Appearance: Clear / S.020 / pH: x  Gluc: x / Ketone: Negative  / Bili: Negative / Urobili: Negative   Blood: x / Protein: 15 / Nitrite: Negative   Leuk Esterase: Trace / RBC: Negative /HPF / WBC 3-5   Sq Epi: x / Non Sq Epi: Occasional / Bacteria: Few              acetaminophen     Tablet .. 650 milliGRAM(s) Oral every 6 hours PRN  calcium carbonate 1250 mG  + Vitamin D (OsCal 500 + D) 1 Tablet(s) Oral three times a day  clonazePAM  Tablet 1 milliGRAM(s) Oral at bedtime PRN  enoxaparin Injectable 40 milliGRAM(s) SubCutaneous daily  folic acid 1 milliGRAM(s) Oral daily  LORazepam   Injectable 1 milliGRAM(s) IV Push every 1 hour PRN  losartan 25 milliGRAM(s) Oral daily  magnesium hydroxide Suspension 30 milliLiter(s) Oral daily PRN  melatonin 3 milliGRAM(s) Oral at bedtime PRN  multivitamin 1 Tablet(s) Oral daily  ondansetron Injectable 4 milliGRAM(s) IV Push every 6 hours PRN  oxyCODONE    IR 5 milliGRAM(s) Oral every 4 hours PRN  oxyCODONE    IR 10 milliGRAM(s) Oral every 4 hours PRN  pantoprazole    Tablet 40 milliGRAM(s) Oral before breakfast  polyethylene glycol 3350 17 Gram(s) Oral at bedtime  senna 2 Tablet(s) Oral at bedtime  thiamine 100 milliGRAM(s) Oral daily              REVIEW OF SYSTEMS:  CONSTITUTIONAL: No fever, no weight loss, or no fatigue  NECK: No pain, no stiffness  RESPIRATORY: No cough, no wheezing, no chills, no hemoptysis, No shortness of breath  CARDIOVASCULAR: No chest pain, no palpitations, no dizziness, no leg swelling  GASTROINTESTINAL: No abdominal pain. No nausea, no vomiting, no hematemesis; No diarrhea, no constipation. No melena, no hematochezia.  GENITOURINARY: No dysuria, no frequency, no hematuria, no incontinence  NEUROLOGICAL: No headaches, no loss of strength, no numbness, no tremors  SKIN: No itching, no burning  MUSCULOSKELETAL: No joint pain, no swelling; No muscle, no back, no extremity pain  PSYCHIATRIC: No depression, no mood swings,   HEME/LYMPH: No easy bruising, no bleeding gums  ALLERY AND IMMUNOLOGIC: No hives       Consultant(s) Notes Reviewed:  [X] YES  [ ] NO    PHYSICAL EXAM:  GENERAL: NAD  HEAD:  Atraumatic, Normocephalic  EYES: EOMI, PERRLA, conjunctiva and sclera clear  ENMT: No tonsillar erythema, exudates, or enlargement; Moist mucous membranes  NECK: Supple, No JVD  NERVOUS SYSTEM:  Awake & alert  CHEST/LUNG: Clear to auscultation bilaterally; No rales, rhonchi, wheezing,  HEART: Regular rate and rhythm  ABDOMEN: Soft, Nontender, Nondistended; Bowel sounds present  EXTREMITIES:  No clubbing, cyanosis, or edema  LYMPH: No lymphadenopathy noted  SKIN: No rashes      Advanced care planning discussed with patient/family [X] YES   [ ] NO    Advanced care planning discussed with patient/family. Patient's health status was discussed. All appropriate changes have been made regarding patient's end-of-life care. Advanced care planning forms reviewed/discussed/completed.  20 minutes spent.   
Patient is a 71y old  Female who presents with a chief complaint of fall (29 Oct 2021 08:45)      INTERVAL HPI/OVERNIGHT EVENTS: Patient seen and examined. NAD. No complaints.    Vital Signs Last 24 Hrs  T(C): 37 (29 Oct 2021 03:57), Max: 37 (28 Oct 2021 21:07)  T(F): 98.6 (29 Oct 2021 03:57), Max: 98.6 (28 Oct 2021 21:07)  HR: 122 (29 Oct 2021 10:53) (105 - 122)  BP: 131/82 (29 Oct 2021 10:53) (128/63 - 134/73)  BP(mean): --  RR: 18 (29 Oct 2021 03:57) (18 - 18)  SpO2: 96% (29 Oct 2021 10:53) (96% - 100%)    10-29    140  |  101  |  7   ----------------------------<  160<H>  3.8   |  33<H>  |  0.68    Ca    9.0      29 Oct 2021 07:48  Phos  2.6     10-29  Mg     2.3     10-29    TPro  7.0  /  Alb  2.7<L>  /  TBili  0.4  /  DBili  .10  /  AST  17  /  ALT  15  /  AlkPhos  116  10-28                          8.4    14.02 )-----------( 427      ( 29 Oct 2021 07:44 )             27.3     PT/INR - ( 28 Oct 2021 07:48 )   PT: 13.9 sec;   INR: 1.20 ratio         PTT - ( 28 Oct 2021 07:48 )  PTT:29.6 sec  CAPILLARY BLOOD GLUCOSE                  acetaminophen     Tablet .. 650 milliGRAM(s) Oral every 6 hours PRN  bisacodyl Suppository 10 milliGRAM(s) Rectal once PRN  calcium carbonate 1250 mG  + Vitamin D (OsCal 500 + D) 1 Tablet(s) Oral three times a day  clonazePAM  Tablet 1 milliGRAM(s) Oral at bedtime PRN  enoxaparin Injectable 40 milliGRAM(s) SubCutaneous daily  folic acid 1 milliGRAM(s) Oral daily  LORazepam   Injectable 1 milliGRAM(s) IV Push every 1 hour PRN  losartan 25 milliGRAM(s) Oral daily  magnesium hydroxide Suspension 30 milliLiter(s) Oral daily PRN  melatonin 3 milliGRAM(s) Oral at bedtime PRN  multivitamin 1 Tablet(s) Oral daily  ondansetron Injectable 4 milliGRAM(s) IV Push every 6 hours PRN  oxyCODONE    IR 5 milliGRAM(s) Oral every 4 hours PRN  oxyCODONE    IR 10 milliGRAM(s) Oral every 4 hours PRN  pantoprazole    Tablet 40 milliGRAM(s) Oral before breakfast  polyethylene glycol 3350 17 Gram(s) Oral at bedtime  senna 2 Tablet(s) Oral at bedtime  thiamine 100 milliGRAM(s) Oral daily              REVIEW OF SYSTEMS:  CONSTITUTIONAL: No fever, no weight loss, or no fatigue  NECK: No pain, no stiffness  RESPIRATORY: No cough, no wheezing, no chills, no hemoptysis, No shortness of breath  CARDIOVASCULAR: No chest pain, no palpitations, no dizziness, no leg swelling  GASTROINTESTINAL: No abdominal pain. No nausea, no vomiting, no hematemesis; No diarrhea, no constipation. No melena, no hematochezia.  GENITOURINARY: No dysuria, no frequency, no hematuria, no incontinence  NEUROLOGICAL: No headaches, no loss of strength, no numbness, no tremors  SKIN: No itching, no burning  MUSCULOSKELETAL: No joint pain, no swelling; No muscle, no back, no extremity pain  PSYCHIATRIC: No depression, no mood swings,   HEME/LYMPH: No easy bruising, no bleeding gums  ALLERY AND IMMUNOLOGIC: No hives       Consultant(s) Notes Reviewed:  [X] YES  [ ] NO    PHYSICAL EXAM:  GENERAL: NAD  HEAD:  Atraumatic, Normocephalic  EYES: EOMI, PERRLA, conjunctiva and sclera clear  ENMT: No tonsillar erythema, exudates, or enlargement; Moist mucous membranes  NECK: Supple, No JVD  NERVOUS SYSTEM:  Awake & alert  CHEST/LUNG: Clear to auscultation bilaterally; No rales, rhonchi, wheezing,  HEART: Regular rate and rhythm  ABDOMEN: Soft, Nontender, Nondistended; Bowel sounds present  EXTREMITIES:  No clubbing, cyanosis, or edema  LYMPH: No lymphadenopathy noted  SKIN: No rashes      Advanced care planning discussed with patient/family [X] YES   [ ] NO    Advanced care planning discussed with patient/family. Patient's health status was discussed. All appropriate changes have been made regarding patient's end-of-life care. Advanced care planning forms reviewed/discussed/completed.  20 minutes spent.

## 2022-04-19 NOTE — DISCHARGE NOTE NURSING/CASE MANAGEMENT/SOCIAL WORK - NSDCPETBCESMAN_GEN_ALL_CORE
If you are a smoker, it is important for your health to stop smoking. Please be aware that second hand smoke is also harmful.
4 = No assist / stand by assistance

## 2022-05-24 NOTE — ED ADULT TRIAGE NOTE - BP NONINVASIVE DIASTOLIC (MM HG)
Attempt to reach patient for follow up. Mailbox full, unable to leave message. Will attempt to reach in one week, 6.1. Per chart review, patient has appt with Dr. Cozetta Phalen on 6/3 and Dr. Ruben Chowdary on 6/14. Appt at [facility] on 7/15 for preadmission testing and colonoscopy with possible polypectomy on 7/22.
95

## 2022-10-16 NOTE — ED PROVIDER NOTE - CROS ED CONS ALL NEG
You can access the FollowMyHealth Patient Portal offered by [facility] by registering at the following website: [email or web address]. By joining [facility]’s FollowMyHealth portal, you will also be able to view your health information using other applications (apps) compatible with our system.
negative...

## 2023-05-23 NOTE — ED ADULT NURSE NOTE - CHPI ED SYMPTOMS POS
Patient is a 85 y/o M from [facility] [place] [employer], [facility] who walks with a walker and has PMH of HTN, HLD, venous insufficiency, CAD, BPH, CHF who presented s/p mechanical fall. Admitted to medicine for AHRF 2/2 to atelectasis and mechanical fall. Noted to have erythema to upper thigh and complte course of IV abxCT Head, chest, spine no fx. Chest CT shows atelectasis. CXR shows small bibasilar infiltrates, pulmonary consulted, required nasal cannula now tolerating room air. Also found to have urinary retention, moreno placed, + hematuria, Urology consulted, failed TOV and re placed on 5/15 by Urology. Noted with hematuria today, Hgb stable, moreno catheter irrigated and noted with improvement in hematuria. Per discussion with Urology will continue to monitor. PT reccs ALBERTA, pending auth to [facility]  5/22-Pt. with gross hematuria over night, required moreno replacement by uro, 6 eye moreno placed, CBI continues with clear drainage at this time.  5/23-CBI clamped by uro in am, moreno draining small amounts yellow urine, no hematuria.  Pt. appears dehydrated with dry mucous membranes and elevated SCR.  Started IVF NS     Patient is a 85 y/o M from [facility] [employer], [facility] who walks with a walker and has PMH of HTN, HLD, venous insufficiency, CAD, BPH, CHF who presented s/p mechanical fall. Admitted to medicine for AHRF 2/2 to atelectasis and mechanical fall. Noted to have erythema to upper thigh and complte course of IV abxCT Head, chest, spine no fx. Chest CT shows atelectasis. CXR shows small bibasilar infiltrates, pulmonary consulted, required nasal cannula now tolerating room air. Also found to have urinary retention, moreno placed, + hematuria, Urology consulted, failed TOV and re placed on 5/15 by Urology. Noted with hematuria today, Hgb stable, moreno catheter irrigated and noted with improvement in hematuria. Per discussion with Urology will continue to monitor. PT reccs ALBERTA, pending auth to [facility]  5/22-Pt. with gross hematuria over night, required moreno replacement by uro, 6 eye moreno placed, CBI continues with clear drainage at this time.  5/23-CBI clamped by uro in am, moreno draining small amounts yellow urine, no hematuria.  Pt. appears dehydrated with dry mucous membranes and elevated SCR.  Started IVF NS @80cc/hr.    lt shoulder area pain/INFLAMMATION/PAIN/TENDERNESS/DIFFICULTY BEARING WEIGHT

## 2023-06-22 NOTE — OCCUPATIONAL THERAPY INITIAL EVALUATION ADULT - REHAB POTENTIAL, OT EVAL
Restorative Technician Note      Patient Name: Vangie Elliott     Restorative Tech Visit Date: 06/22/23  Note Type: Mobility  Patient Position Upon Consult: Supine  Activity Performed: Ambulated  Assistive Device: Roller walker  Patient Position at End of Consult: Bedside chair;  All needs within [facility] good, to achieve stated therapy goals

## 2023-07-02 RX ADMIN — MORPHINE SULFATE 4 MILLIGRAM(S): 50 CAPSULE, EXTENDED RELEASE ORAL at 02:50

## 2023-07-14 ENCOUNTER — INPATIENT (INPATIENT)
Facility: HOSPITAL | Age: 73
LOS: 3 days | Discharge: HOME | DRG: 552 | End: 2023-07-18
Attending: INTERNAL MEDICINE | Admitting: STUDENT IN AN ORGANIZED HEALTH CARE EDUCATION/TRAINING PROGRAM
Payer: MEDICARE

## 2023-07-14 VITALS
HEIGHT: 63 IN | TEMPERATURE: 99 F | RESPIRATION RATE: 16 BRPM | WEIGHT: 108.03 LBS | DIASTOLIC BLOOD PRESSURE: 74 MMHG | HEART RATE: 72 BPM | SYSTOLIC BLOOD PRESSURE: 126 MMHG | OXYGEN SATURATION: 97 %

## 2023-07-14 DIAGNOSIS — Z90.89 ACQUIRED ABSENCE OF OTHER ORGANS: Chronic | ICD-10-CM

## 2023-07-14 PROCEDURE — 73110 X-RAY EXAM OF WRIST: CPT | Mod: 26,LT

## 2023-07-14 PROCEDURE — 73130 X-RAY EXAM OF HAND: CPT | Mod: 26,LT

## 2023-07-14 PROCEDURE — 73522 X-RAY EXAM HIPS BI 3-4 VIEWS: CPT | Mod: 26

## 2023-07-14 PROCEDURE — 99285 EMERGENCY DEPT VISIT HI MDM: CPT

## 2023-07-14 NOTE — ED ADULT TRIAGE NOTE - CHIEF COMPLAINT QUOTE
Patient brought  by ambulance from home as reported fell complaining of right arm/elbow pain received patient on splint
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
## 2023-07-15 DIAGNOSIS — S32.9XXA FRACTURE OF UNSPECIFIED PARTS OF LUMBOSACRAL SPINE AND PELVIS, INITIAL ENCOUNTER FOR CLOSED FRACTURE: ICD-10-CM

## 2023-07-15 DIAGNOSIS — Z29.9 ENCOUNTER FOR PROPHYLACTIC MEASURES, UNSPECIFIED: ICD-10-CM

## 2023-07-15 DIAGNOSIS — D72.829 ELEVATED WHITE BLOOD CELL COUNT, UNSPECIFIED: ICD-10-CM

## 2023-07-15 DIAGNOSIS — D64.9 ANEMIA, UNSPECIFIED: ICD-10-CM

## 2023-07-15 DIAGNOSIS — E87.6 HYPOKALEMIA: ICD-10-CM

## 2023-07-15 DIAGNOSIS — Z98.890 OTHER SPECIFIED POSTPROCEDURAL STATES: Chronic | ICD-10-CM

## 2023-07-15 DIAGNOSIS — I10 ESSENTIAL (PRIMARY) HYPERTENSION: ICD-10-CM

## 2023-07-15 LAB
A1C WITH ESTIMATED AVERAGE GLUCOSE RESULT: 6.3 % — HIGH (ref 4–5.6)
ALBUMIN SERPL ELPH-MCNC: 2.7 G/DL — LOW (ref 3.3–5)
ALBUMIN SERPL ELPH-MCNC: 2.8 G/DL — LOW (ref 3.3–5)
ALP SERPL-CCNC: 76 U/L — SIGNIFICANT CHANGE UP (ref 40–120)
ALP SERPL-CCNC: 82 U/L — SIGNIFICANT CHANGE UP (ref 40–120)
ALT FLD-CCNC: 12 U/L — SIGNIFICANT CHANGE UP (ref 12–78)
ALT FLD-CCNC: 12 U/L — SIGNIFICANT CHANGE UP (ref 12–78)
ANION GAP SERPL CALC-SCNC: 5 MMOL/L — SIGNIFICANT CHANGE UP (ref 5–17)
ANION GAP SERPL CALC-SCNC: 6 MMOL/L — SIGNIFICANT CHANGE UP (ref 5–17)
APTT BLD: 32.8 SEC — SIGNIFICANT CHANGE UP (ref 27.5–35.5)
AST SERPL-CCNC: 19 U/L — SIGNIFICANT CHANGE UP (ref 15–37)
AST SERPL-CCNC: 22 U/L — SIGNIFICANT CHANGE UP (ref 15–37)
BASOPHILS # BLD AUTO: 0.08 K/UL — SIGNIFICANT CHANGE UP (ref 0–0.2)
BASOPHILS NFR BLD AUTO: 0.7 % — SIGNIFICANT CHANGE UP (ref 0–2)
BILIRUB SERPL-MCNC: 0.4 MG/DL — SIGNIFICANT CHANGE UP (ref 0.2–1.2)
BILIRUB SERPL-MCNC: 0.4 MG/DL — SIGNIFICANT CHANGE UP (ref 0.2–1.2)
BUN SERPL-MCNC: 12 MG/DL — SIGNIFICANT CHANGE UP (ref 7–23)
BUN SERPL-MCNC: 13 MG/DL — SIGNIFICANT CHANGE UP (ref 7–23)
CALCIUM SERPL-MCNC: 8.7 MG/DL — SIGNIFICANT CHANGE UP (ref 8.5–10.1)
CALCIUM SERPL-MCNC: 9.1 MG/DL — SIGNIFICANT CHANGE UP (ref 8.5–10.1)
CHLORIDE SERPL-SCNC: 97 MMOL/L — SIGNIFICANT CHANGE UP (ref 96–108)
CHLORIDE SERPL-SCNC: 99 MMOL/L — SIGNIFICANT CHANGE UP (ref 96–108)
CHOLEST SERPL-MCNC: 175 MG/DL — SIGNIFICANT CHANGE UP
CO2 SERPL-SCNC: 33 MMOL/L — HIGH (ref 22–31)
CO2 SERPL-SCNC: 34 MMOL/L — HIGH (ref 22–31)
CREAT SERPL-MCNC: 0.91 MG/DL — SIGNIFICANT CHANGE UP (ref 0.5–1.3)
CREAT SERPL-MCNC: 0.92 MG/DL — SIGNIFICANT CHANGE UP (ref 0.5–1.3)
EGFR: 66 ML/MIN/1.73M2 — SIGNIFICANT CHANGE UP
EGFR: 67 ML/MIN/1.73M2 — SIGNIFICANT CHANGE UP
EOSINOPHIL # BLD AUTO: 0.35 K/UL — SIGNIFICANT CHANGE UP (ref 0–0.5)
EOSINOPHIL NFR BLD AUTO: 3.2 % — SIGNIFICANT CHANGE UP (ref 0–6)
ESTIMATED AVERAGE GLUCOSE: 134 MG/DL — HIGH (ref 68–114)
GLUCOSE SERPL-MCNC: 105 MG/DL — HIGH (ref 70–99)
GLUCOSE SERPL-MCNC: 96 MG/DL — SIGNIFICANT CHANGE UP (ref 70–99)
HCT VFR BLD CALC: 28 % — LOW (ref 34.5–45)
HCT VFR BLD CALC: 28.3 % — LOW (ref 34.5–45)
HDLC SERPL-MCNC: 49 MG/DL — LOW
HGB BLD-MCNC: 8.5 G/DL — LOW (ref 11.5–15.5)
HGB BLD-MCNC: 8.7 G/DL — LOW (ref 11.5–15.5)
IMM GRANULOCYTES NFR BLD AUTO: 0.5 % — SIGNIFICANT CHANGE UP (ref 0–0.9)
INR BLD: 1.22 RATIO — HIGH (ref 0.88–1.16)
LIPID PNL WITH DIRECT LDL SERPL: 108 MG/DL — HIGH
LYMPHOCYTES # BLD AUTO: 2.43 K/UL — SIGNIFICANT CHANGE UP (ref 1–3.3)
LYMPHOCYTES # BLD AUTO: 22.1 % — SIGNIFICANT CHANGE UP (ref 13–44)
MCHC RBC-ENTMCNC: 27.9 PG — SIGNIFICANT CHANGE UP (ref 27–34)
MCHC RBC-ENTMCNC: 28.1 PG — SIGNIFICANT CHANGE UP (ref 27–34)
MCHC RBC-ENTMCNC: 30 GM/DL — LOW (ref 32–36)
MCHC RBC-ENTMCNC: 31.1 GM/DL — LOW (ref 32–36)
MCV RBC AUTO: 90.3 FL — SIGNIFICANT CHANGE UP (ref 80–100)
MCV RBC AUTO: 92.8 FL — SIGNIFICANT CHANGE UP (ref 80–100)
MONOCYTES # BLD AUTO: 1.41 K/UL — HIGH (ref 0–0.9)
MONOCYTES NFR BLD AUTO: 12.8 % — SIGNIFICANT CHANGE UP (ref 2–14)
NEUTROPHILS # BLD AUTO: 6.66 K/UL — SIGNIFICANT CHANGE UP (ref 1.8–7.4)
NEUTROPHILS NFR BLD AUTO: 60.7 % — SIGNIFICANT CHANGE UP (ref 43–77)
NON HDL CHOLESTEROL: 126 MG/DL — SIGNIFICANT CHANGE UP
NRBC # BLD: 0 /100 WBCS — SIGNIFICANT CHANGE UP (ref 0–0)
NRBC # BLD: 0 /100 WBCS — SIGNIFICANT CHANGE UP (ref 0–0)
PLATELET # BLD AUTO: 423 K/UL — HIGH (ref 150–400)
PLATELET # BLD AUTO: 432 K/UL — HIGH (ref 150–400)
POTASSIUM SERPL-MCNC: 2.9 MMOL/L — CRITICAL LOW (ref 3.5–5.3)
POTASSIUM SERPL-MCNC: 3 MMOL/L — LOW (ref 3.5–5.3)
POTASSIUM SERPL-SCNC: 2.9 MMOL/L — CRITICAL LOW (ref 3.5–5.3)
POTASSIUM SERPL-SCNC: 3 MMOL/L — LOW (ref 3.5–5.3)
PROT SERPL-MCNC: 6.8 G/DL — SIGNIFICANT CHANGE UP (ref 6–8.3)
PROT SERPL-MCNC: 6.9 G/DL — SIGNIFICANT CHANGE UP (ref 6–8.3)
PROTHROM AB SERPL-ACNC: 14.3 SEC — HIGH (ref 10.5–13.4)
RBC # BLD: 3.05 M/UL — LOW (ref 3.8–5.2)
RBC # BLD: 3.1 M/UL — LOW (ref 3.8–5.2)
RBC # FLD: 13.7 % — SIGNIFICANT CHANGE UP (ref 10.3–14.5)
RBC # FLD: 13.8 % — SIGNIFICANT CHANGE UP (ref 10.3–14.5)
SODIUM SERPL-SCNC: 137 MMOL/L — SIGNIFICANT CHANGE UP (ref 135–145)
SODIUM SERPL-SCNC: 137 MMOL/L — SIGNIFICANT CHANGE UP (ref 135–145)
TRIGL SERPL-MCNC: 100 MG/DL — SIGNIFICANT CHANGE UP
WBC # BLD: 10.98 K/UL — HIGH (ref 3.8–10.5)
WBC # BLD: 13.06 K/UL — HIGH (ref 3.8–10.5)
WBC # FLD AUTO: 10.98 K/UL — HIGH (ref 3.8–10.5)
WBC # FLD AUTO: 13.06 K/UL — HIGH (ref 3.8–10.5)

## 2023-07-15 PROCEDURE — 71045 X-RAY EXAM CHEST 1 VIEW: CPT | Mod: 26

## 2023-07-15 PROCEDURE — 73562 X-RAY EXAM OF KNEE 3: CPT | Mod: 26,RT

## 2023-07-15 PROCEDURE — 99223 1ST HOSP IP/OBS HIGH 75: CPT | Mod: GC

## 2023-07-15 PROCEDURE — 93010 ELECTROCARDIOGRAM REPORT: CPT

## 2023-07-15 PROCEDURE — 73610 X-RAY EXAM OF ANKLE: CPT | Mod: 26,RT

## 2023-07-15 PROCEDURE — 72192 CT PELVIS W/O DYE: CPT | Mod: 26,MA

## 2023-07-15 PROCEDURE — 93971 EXTREMITY STUDY: CPT | Mod: 26,RT

## 2023-07-15 RX ORDER — MORPHINE SULFATE 50 MG/1
4 CAPSULE, EXTENDED RELEASE ORAL ONCE
Refills: 0 | Status: DISCONTINUED | OUTPATIENT
Start: 2023-07-15 | End: 2023-07-15

## 2023-07-15 RX ORDER — POTASSIUM CHLORIDE 20 MEQ
40 PACKET (EA) ORAL ONCE
Refills: 0 | Status: COMPLETED | OUTPATIENT
Start: 2023-07-15 | End: 2023-07-15

## 2023-07-15 RX ORDER — SENNA PLUS 8.6 MG/1
2 TABLET ORAL AT BEDTIME
Refills: 0 | Status: DISCONTINUED | OUTPATIENT
Start: 2023-07-15 | End: 2023-07-18

## 2023-07-15 RX ORDER — CHOLECALCIFEROL (VITAMIN D3) 125 MCG
1000 CAPSULE ORAL DAILY
Refills: 0 | Status: DISCONTINUED | OUTPATIENT
Start: 2023-07-15 | End: 2023-07-18

## 2023-07-15 RX ORDER — L.ACIDOPH/B.ANIMALIS/B.LONGUM 15B CELL
1 CAPSULE ORAL
Qty: 0 | Refills: 0 | DISCHARGE

## 2023-07-15 RX ORDER — ACETAMINOPHEN 500 MG
650 TABLET ORAL EVERY 6 HOURS
Refills: 0 | Status: DISCONTINUED | OUTPATIENT
Start: 2023-07-15 | End: 2023-07-18

## 2023-07-15 RX ORDER — ONDANSETRON 8 MG/1
4 TABLET, FILM COATED ORAL ONCE
Refills: 0 | Status: COMPLETED | OUTPATIENT
Start: 2023-07-15 | End: 2023-07-15

## 2023-07-15 RX ORDER — OXYCODONE HYDROCHLORIDE 5 MG/1
5 TABLET ORAL EVERY 4 HOURS
Refills: 0 | Status: DISCONTINUED | OUTPATIENT
Start: 2023-07-15 | End: 2023-07-18

## 2023-07-15 RX ORDER — CHOLECALCIFEROL (VITAMIN D3) 125 MCG
1 CAPSULE ORAL
Refills: 0 | DISCHARGE

## 2023-07-15 RX ORDER — NALOXONE HYDROCHLORIDE 4 MG/.1ML
0.4 SPRAY NASAL ONCE
Refills: 0 | Status: DISCONTINUED | OUTPATIENT
Start: 2023-07-15 | End: 2023-07-18

## 2023-07-15 RX ORDER — ONDANSETRON 8 MG/1
4 TABLET, FILM COATED ORAL EVERY 8 HOURS
Refills: 0 | Status: DISCONTINUED | OUTPATIENT
Start: 2023-07-15 | End: 2023-07-18

## 2023-07-15 RX ORDER — LOSARTAN POTASSIUM 100 MG/1
25 TABLET, FILM COATED ORAL DAILY
Refills: 0 | Status: DISCONTINUED | OUTPATIENT
Start: 2023-07-15 | End: 2023-07-18

## 2023-07-15 RX ORDER — LANOLIN ALCOHOL/MO/W.PET/CERES
3 CREAM (GRAM) TOPICAL AT BEDTIME
Refills: 0 | Status: DISCONTINUED | OUTPATIENT
Start: 2023-07-15 | End: 2023-07-18

## 2023-07-15 RX ORDER — POLYETHYLENE GLYCOL 3350 17 G/17G
17 POWDER, FOR SOLUTION ORAL DAILY
Refills: 0 | Status: DISCONTINUED | OUTPATIENT
Start: 2023-07-15 | End: 2023-07-18

## 2023-07-15 RX ORDER — OXYCODONE HYDROCHLORIDE 5 MG/1
2.5 TABLET ORAL EVERY 4 HOURS
Refills: 0 | Status: DISCONTINUED | OUTPATIENT
Start: 2023-07-15 | End: 2023-07-18

## 2023-07-15 RX ADMIN — OXYCODONE HYDROCHLORIDE 5 MILLIGRAM(S): 5 TABLET ORAL at 21:00

## 2023-07-15 RX ADMIN — Medication 40 MILLIEQUIVALENT(S): at 06:26

## 2023-07-15 RX ADMIN — OXYCODONE HYDROCHLORIDE 5 MILLIGRAM(S): 5 TABLET ORAL at 10:45

## 2023-07-15 RX ADMIN — OXYCODONE HYDROCHLORIDE 5 MILLIGRAM(S): 5 TABLET ORAL at 11:45

## 2023-07-15 RX ADMIN — MORPHINE SULFATE 4 MILLIGRAM(S): 50 CAPSULE, EXTENDED RELEASE ORAL at 00:53

## 2023-07-15 RX ADMIN — MORPHINE SULFATE 4 MILLIGRAM(S): 50 CAPSULE, EXTENDED RELEASE ORAL at 01:20

## 2023-07-15 RX ADMIN — ONDANSETRON 4 MILLIGRAM(S): 8 TABLET, FILM COATED ORAL at 00:53

## 2023-07-15 RX ADMIN — OXYCODONE HYDROCHLORIDE 5 MILLIGRAM(S): 5 TABLET ORAL at 20:00

## 2023-07-15 RX ADMIN — Medication 40 MILLIEQUIVALENT(S): at 02:15

## 2023-07-15 RX ADMIN — MORPHINE SULFATE 4 MILLIGRAM(S): 50 CAPSULE, EXTENDED RELEASE ORAL at 02:28

## 2023-07-15 RX ADMIN — OXYCODONE HYDROCHLORIDE 5 MILLIGRAM(S): 5 TABLET ORAL at 06:25

## 2023-07-15 RX ADMIN — Medication 1000 UNIT(S): at 13:54

## 2023-07-15 RX ADMIN — LOSARTAN POTASSIUM 25 MILLIGRAM(S): 100 TABLET, FILM COATED ORAL at 06:25

## 2023-07-15 NOTE — H&P ADULT - NSHPPHYSICALEXAM_GEN_ALL_CORE
T(C): 37.3 (07-14-23 @ 21:56), Max: 37.3 (07-14-23 @ 21:56)  HR: 69 (07-15-23 @ 02:14) (69 - 72)  BP: 132/71 (07-15-23 @ 02:14) (126/74 - 132/71)  RR: 16 (07-15-23 @ 02:14) (16 - 16)  SpO2: 99% (07-15-23 @ 02:14) (97% - 99%)    GENERAL: +patient appears tired and slightly uncomfortable due to pain. L arm splint in place. no acute distress, appropriate, pleasant  EYES: sclera clear, no exudates  ENMT: oropharynx clear without erythema.  NECK: supple, soft.  LUNGS: good air entry bilaterally, clear to auscultation, symmetric breath sounds, no wheezing or rhonchi appreciated  HEART: soft S1/S2, regular rate and rhythm, no murmurs noted, no lower extremity edema  GASTROINTESTINAL: abdomen is soft, nontender, nondistended, no palpable masses  INTEGUMENT: +ecchymosis over L thumb and pinky. no lesions noted  MUSCULOSKELETAL: +TTP over L pelvic bone. +TTP L wrist. no clubbing or cyanosis, no obvious deformity  NEUROLOGIC: awake, alert, oriented x3. no obvious sensory deficits  PSYCHIATRIC: mood is good, affect is congruent, linear and logical thought process T(C): 37.3 (07-14-23 @ 21:56), Max: 37.3 (07-14-23 @ 21:56)  HR: 69 (07-15-23 @ 02:14) (69 - 72)  BP: 132/71 (07-15-23 @ 02:14) (126/74 - 132/71)  RR: 16 (07-15-23 @ 02:14) (16 - 16)  SpO2: 99% (07-15-23 @ 02:14) (97% - 99%)  GENERAL: +patient appears tired and slightly uncomfortable due to pain. L arm splint in place. no acute distress, appropriate, pleasant  EYES: sclera clear, no exudates  ENMT: oropharynx clear without erythema.  NECK: supple, soft.  LUNGS: good air entry bilaterally, clear to auscultation, symmetric breath sounds, no wheezing or rhonchi appreciated  HEART: soft S1/S2, regular rate and rhythm, no murmurs noted, no lower extremity edema  GASTROINTESTINAL: abdomen is soft, nontender, nondistended, no palpable masses  INTEGUMENT: +ecchymosis over L thumb and pinky. no lesions noted  MUSCULOSKELETAL: +TTP over L pelvic bone. +TTP L wrist. no clubbing or cyanosis, no obvious deformity  NEUROLOGIC: awake, alert, oriented x3. no obvious sensory deficits  PSYCHIATRIC: mood is good, affect is congruent, linear and logical thought process

## 2023-07-15 NOTE — H&P ADULT - PROBLEM SELECTOR PLAN 3
K 2.9 on admission.  given KCL 40mEq x1 in ED  replete K prn  f/u AM labs K 2.9 on admission.  given KCL 40mEq x2 in ED  replete K prn  f/u AM labs

## 2023-07-15 NOTE — ED ADULT NURSE REASSESSMENT NOTE - NSFALLHARMRISKINTERV_ED_ALL_ED

## 2023-07-15 NOTE — ED PROVIDER NOTE - CLINICAL SUMMARY MEDICAL DECISION MAKING FREE TEXT BOX
S/P fall,  left wrist and left pelvic pain, ND fractures of the left pelvis and left distal radius ,  unable to ambulate, admitted to medicine, ortho consult

## 2023-07-15 NOTE — CONSULT NOTE ADULT - CONSULT REASON
Consulted at 07:00  Patient seen at 07:15 L pelvic fractures  Consulted at 07:00  Patient seen at 07:15

## 2023-07-15 NOTE — PATIENT PROFILE ADULT - OVER THE PAST TWO WEEKS HAVE YOU FELT DOWN, DEPRESSED OR HOPELESS?
Baseline 1.30s to 1.60s  Cr 1.59>1.71>2.13>1.82 > 2.85 > 3 > 2.58 > 2.28 > 2.13 > 3.02>2.81  Likely overdiuresis  Hold Lasix  Nephrology onboard   Avoid nephrotoxic agents  Will keep monitoring   Rest as above  Renal US shows no hydro  bladder scan is neg on 4/17  straight cath on 4/19 drained 500 cc  repeat bladder scan  s/p albumin 25% on 50 ml q8h x3 doses  improved Scr  continue to monitor if Scr keep improving consider starting a small dose of lasix 20 mg twice a week on d/c  f/u repeat bladder scan post void 4/24 81cc, passed TOV    Continue bladder scan; given mental status/developmental delay; will determine if patient needs chance at facility/group home no - Baseline 1.30s to 1.60s  - Cr 2.81> 3.3  - Likely 2/2 overdiuresis  - Continue holding Lasix  - Avoid nephrotoxic agents  - Continue monitoring bmp  - Renal US shows no hydro  - Continue bladder scan as indicated  - s/p albumin 25% on 50 ml q8h x3 doses  - Given mental status/developmental delay, will determine if patient would benefit from chance at facility/group home  - Continue to hold Chance cath and straight cath at this time  - Nephrology Consulted

## 2023-07-15 NOTE — ED PROVIDER NOTE - OBJECTIVE STATEMENT
74 y/o female S/P fall with skeletal injuries, Patient brought  by ambulance from home and states that she  fell a couple of days ago and has left wrist and left pelvic pain, she has no assistance at home and can not ambulate. no headache, no neck pain, no chest pain, no SOB, no palpitations, no n/v, no neuro changes.

## 2023-07-15 NOTE — H&P ADULT - ASSESSMENT
73 y/o F pmhx of anxiety, HTN, migraines, IBS, Hx of falls s/p R hip hemiarthroplasty  presented after a fall. Admitted for management of pelvic fracture.

## 2023-07-15 NOTE — ED ADULT NURSE NOTE - OBJECTIVE STATEMENT
Pt BIBEMS c/o pelvic pain and left wrist pain s/p fall on Wednesday.  Pt states she was able to walk after fall but pain has been getting progressively worse.  Denies any chest pain or SOB.  No n/v/d.  Bruising and swelling noted to left wrist, velcro splint applied.  Tenderness noted to pelvic region upon palpation.  Maintain comfort and safety.

## 2023-07-15 NOTE — H&P ADULT - PROBLEM SELECTOR PLAN 4
Hb 8.7 on admission (baseline ~9.5 per chart review)  Pt is asymptomatic, no active signs of bleeding.  f/u AM labs Hb 8.7 on admission (baseline ~9.5 per chart review)  Pt is asymptomatic, no active signs of bleeding.  f/u AM labs  iron studies

## 2023-07-15 NOTE — H&P ADULT - HISTORY OF PRESENT ILLNESS
73 y/o F pmhx of anxiety, HTN, migraines, IBS, Hx of falls s/p R hip hemiarthroplasty  presented after a fall. Denies head trauma or LOC. Pt fell 3 days ago when she was going to the bathroom late at night. Normally ambulates w/ a walker. She was going to get a walker and fell on her L arm and L hip. She was unable to get up from the ground. Her son had to help her up into her bed. Pt reports being unable to move due to the pain. for past 2 days. Reports pain around her L pelvis, L wrist and elbow. States they are a constant sharp, aching pain that worsens w/ movement. Currently has on a L arm splint. Denies numbness/tingling. Denies HA, chest pain, SOB, n/v/d, abdominal pain, urinary Sx.     ED Course:  Vitals: 99.1F, 72bpm, 126/74, 97% on RA  Labs: WBC 13, Hb 8.7, K 2.9  EKG: NSR 87bpm  CT pelvis no cont - Acute mildly displaced left superior and inferior pubic ramus fractures. Acute nondisplaced left sacral wing fractures.  XR L wrist - pending read  XR L hand - pending read  XR B/L hip - pending read  CXR - pending read    In ED given morphine x2, percocet x1, KCL 73 y/o F pmhx of anxiety, HTN, migraines, IBS, Hx of falls s/p R hip hemiarthroplasty  presented after a fall. Denies head trauma or LOC. Pt fell 3 days ago when she was going to the bathroom late at night. Normally ambulates w/ a walker. She was going to get a walker and fell on her L arm and L hip. She was unable to get up from the ground. Her son had to help her up into her bed. Pt reports being unable to move due to the pain. for past 2 days. Reports pain around her L pelvis, L wrist and elbow. States they are a constant sharp, aching pain that worsens w/ movement. States she took tylenol at home for the pain without any relief. Currently has on a L arm splint. Denies numbness/tingling. Denies HA, chest pain, SOB, n/v/d, abdominal pain, urinary Sx.     ED Course:  Vitals: 99.1F, 72bpm, 126/74, 97% on RA  Labs: WBC 13, Hb 8.7, K 2.9  EKG: NSR 87bpm  CT pelvis no cont - Acute mildly displaced left superior and inferior pubic ramus fractures. Acute nondisplaced left sacral wing fractures.  XR L wrist - pending read  XR L hand - pending read  XR B/L hip - pending read  CXR - pending read    In ED given morphine x2, percocet x1, KCL 71 y/o F pmhx of anxiety, HTN, migraines, IBS, Hx of falls s/p R hip hemiarthroplasty  presented after a fall. Denies head trauma or LOC. Pt fell 3 days ago when she was going to the bathroom late at night. Normally ambulates w/ a walker. She was going to get a walker and fell on her L arm and L hip. She was unable to get up from the ground. Her son had to help her up into her bed. Pt reports being unable to move due to the pain. for past 2 days. Reports pain around her L pelvis, L wrist and elbow. States they are a constant sharp, aching pain that worsens w/ movement. States she took tylenol at home for the pain without any relief. Currently has on a L arm splint. Denies numbness/tingling. Denies HA, chest pain, SOB, n/v/d, abdominal pain, urinary Sx.     ED Course:  Vitals: 99.1F, 72bpm, 126/74, 97% on RA  Labs: WBC 13, Hb 8.7, K 2.9  EKG: NSR 87bpm  CT pelvis no cont - Acute mildly displaced left superior and inferior pubic ramus fractures. Acute nondisplaced left sacral wing fractures.  XR L wrist - pending official read  XR L hand - pending official read  XR B/L hip - pending official read  CXR - pending read  In ED given morphine x2, percocet x1, KCL

## 2023-07-15 NOTE — ED ADULT NURSE REASSESSMENT NOTE - NS ED NURSE REASSESS COMMENT FT1
report received from previous shift RN. pt a&o x3, laying in stretcher, states that pain is more tolerable now with previous pain meds. verbally denies any new/worsening complaints. respirations even/unlabored, no acute distress noted. TV turned on for pt comfort, comfort/safety maintained. will continue to monitor. pending MedSurg admitted bed placement.

## 2023-07-15 NOTE — H&P ADULT - NSHPREVIEWOFSYSTEMS_GEN_ALL_CORE
CONSTITUTIONAL: denies fever, chills, fatigue, weakness  HEENT: denies blurred vision, sore throat  SKIN: denies new lesions, rash  CARDIOVASCULAR: denies chest pain, chest pressure, palpitations  RESPIRATORY: denies shortness of breath  GASTROINTESTINAL: denies nausea, vomiting, diarrhea, abdominal pain  GENITOURINARY: denies dysuria.  NEUROLOGICAL: denies numbness, headache.  MUSCULOSKELETAL: admits L hand, wrist, forearm pain. admits L pelvic pain.   HEMATOLOGIC: denies gross bleeding.  LYMPHATICS: denies extremity swelling  PSYCHIATRIC: denies recent changes in anxiety, depression

## 2023-07-15 NOTE — H&P ADULT - PROBLEM SELECTOR PLAN 2
Hx of falls s/p R hip hemiarthroplasty.  Pt reports landing on her L arm. Currently in a splint.  f/u L arm XR official reads  PT consult Hx of falls s/p R hip hemiarthroplasty.  Pt reports landing on her L arm. Currently in a splint.  f/u L arm XR official reads  PT consult  ortho consult mechanical fall  Pt reports landing on her L arm. Currently in a splint.  f/u L arm XR official reads  PT consult  ortho consult

## 2023-07-15 NOTE — ED ADULT NURSE NOTE - NSFALLHARMRISKINTERV_ED_ALL_ED
Assistance with ambulation/Communicate risk of Fall with Harm to all staff, patient, and family/Monitor gait and stability/Provide patient with walking aids/Provide visual cue: red socks, yellow wristband, yellow gown, etc/Reinforce activity limits and safety measures with patient and family/Bed in lowest position, wheels locked, appropriate side rails in place/Call bell, personal items and telephone in reach/Instruct patient to call for assistance before getting out of bed/chair/stretcher/Non-slip footwear applied when patient is off stretcher/[place] to call system/Physically safe environment - no spills, clutter or unnecessary equipment/Purposeful Proactive Rounding/Room/bathroom lighting operational, light cord in reach

## 2023-07-15 NOTE — CONSULT NOTE ADULT - SUBJECTIVE AND OBJECTIVE BOX
Patient is a 72F, RHD, with a PMH of HTN, IBS, migraines, and anxiety who presents to the ED s/p mechanical fall. Patient states that she was getting up from bed Wednesday night when she fell forward onto the floor. She denies hitting her head or LOC. She currently admits to pelvic pain and left wrist pain. She ambulates with a walker and denies currently taking any blood thinner. Patient previously had right hemiarthoplasty and left hip CRPP. Patient denies any fever, chills, SOB, nausea, vomiting, diarrhea, numbness or tingling denies any other orthopedic concerns at this time.    PMH:  Hypertension    Anxiety    IBS (irritable bowel syndrome)    Migraine      No Known Allergies  ibuprofen (Stomach Upset)      PHYSICAL EXAM:  T(C): 36.8 (07-15-23 @ 07:21), Max: 37.3 (07-14-23 @ 21:56)  HR: 81 (07-15-23 @ 07:21) (69 - 89)  BP: 142/77 (07-15-23 @ 07:21) (126/74 - 171/81)  RR: 16 (07-15-23 @ 07:21) (16 - 16)  SpO2: 96% (07-15-23 @ 07:21) (94% - 99%)    Gen: NAD, Resting comfortably    Pelvis:  -TTP over pelvis    RLE/LLE:  -Skin intact, no erythema or ecchymosis  -+EHL/FHL/TA/GSC  -+SILT SPN/DPN/TA/Chandra/Saph  -+ DP  -Compartments soft and compressible  -No calf tenderness    LUE:  -+TTP over left wrist  -Skin in tact, no erythema or ecchymosis  -AIN/PIN/Med/Uln/Radial Intact  -+ Radial Pulse      Secondary Assessment:  NC/AT, NTTP of clavicles, NTTP of C-,T-,L-Spine  UEs: NTTP of Shoulders, Elbows, Right Wrist, Hands; NT with AROM/PROM of Shoulders, Elbows, Wrists, Hands; AIN/PIN/Med/Uln/Msc/Rad/Ax intact  LEs: Able to SLR, NT with Log Roll, NT with Heel Strike, Patient TTP over right knee, NTTP of Hips, Left Knee, Ankles, Feet; NT with AROM/PROM of Hips, Knees, Ankles, Feet; Q/H/Gsc/TA/EHL/FHL intact      A/P:  72F with a PMH of HTN, IBS, migraines, and anxiety who presents with a left LC1 fx.    Continue to wear left wrist brace for comfort  Right knee x-ray to r/o fx due to TTP on secondary exam  Analgesia  WBAT  DVT ppx  PT/OT  Ice and elevate as tolerated  No acute orthopedic surgical intervention indicated at this time  Orthopedically stable  Plan discussed with Dr. Dubois who agrees with plan above  Patient can follow up with Dr. Dubois if unable to follow up with Dr. Morris   Patient is a 72F, RHD, ambulator with walker at baseline, with a PMH of HTN, IBS, migraines, and anxiety who presents to the ED s/p mechanical fall. Patient states that she was getting up from bed Wednesday night when she fell forward onto the floor. She denies hitting her head or LOC. She currently admits to pelvic pain and left wrist pain. Denies currently taking any blood thinner. Patient previously had right hemiarthoplasty and left hip CRPP. Patient denies any fever, chills, SOB, nausea, vomiting, diarrhea, numbness or tingling denies any other orthopedic concerns at this time.    PMH:  Hypertension    Anxiety    IBS (irritable bowel syndrome)    Migraine      No Known Allergies  ibuprofen (Stomach Upset)      PHYSICAL EXAM:  T(C): 36.8 (07-15-23 @ 07:21), Max: 37.3 (07-14-23 @ 21:56)  HR: 81 (07-15-23 @ 07:21) (69 - 89)  BP: 142/77 (07-15-23 @ 07:21) (126/74 - 171/81)  RR: 16 (07-15-23 @ 07:21) (16 - 16)  SpO2: 96% (07-15-23 @ 07:21) (94% - 99%)    Gen: NAD, Resting comfortably    Pelvis:  -TTP over pelvis    LLE:  Skin intact, no erythema or ecchymosis  TTP by hip/buttocks; no other pain throughout LLE  Able to SLR  No pain with log roll and axial loading  Motor: +EHL/FHL/TA/GSC  +SILT SPN/DPN/TA/Chandra/Saph  + DP  Compartments soft and compressible  No calf tenderness    LUE:  Skin in tact, no erythema or ecchymosis  Minimal TTP throughout left wrist and the rest of the LUE  No palpable step offs palpated by distal radius  Full AROM/PROM of wrist with minimal pain  Motor: AIN/PIN/Med/Uln/Radial Intact  + Radial Pulse      Secondary Assessment:  NC/AT, NTTP of clavicles, NTTP of C-,T-,L-Spine  RUE: Mild TTP by R knee. NTTP of Shoulder, Elbow, Wrist, Hand; NT with AROM/PROM of Shoulder, Elbow, Wrist, Hand; AIN/PIN/Med/Uln/Msc/Rad/Ax intact  LLE: Able to SLR, NT with Log Roll, NT with Heel Strike, Patient TTP over right knee, NTTP of Hips, Left Knee, Ankles, Feet; NT with AROM/PROM of Hips, Knees, Ankles, Feet; Q/H/Gsc/TA/EHL/FHL intact    Imaging:  CT pelvis: Acute mildly displaced left superior and inferior pubic ramus fractures. Acute nondisplaced left sacral wing fractures. Consistent with LC1 fracture.  Xray L wrist: Possible non-displaced distal fracture.  Xray R knee: No obvious fractures or dislocations       A/P:  72F with a who presents with a left LC1 fx and possible non-displaced left distal radius fracture    Given pt's benign clinical exam for L distal radius. pt can continue to wear left wrist brace for comfort.   Pelvic fractures consistent with LC1 injury, which is usually treated conservatively  Analgesia  WBAT  DVT ppx per primary team  PT/OT  Ice and elevate as tolerated  No acute orthopedic surgical intervention indicated at this time  Orthopedically stable for DC if otherwise medically stable  Plan discussed with Dr. Dubois who agrees with plan above  Patient can follow up with Dr. Dubois if unable to follow up with Dr. Morris Patient is a 72F, RHD, ambulator with walker at baseline, with a PMH of HTN, IBS, migraines, and anxiety who presents to the ED s/p mechanical fall. Patient states that she was getting up from bed Wednesday night when she fell forward onto the floor. She denies hitting her head or LOC. She currently admits to pelvic pain and left wrist pain. Denies currently taking any blood thinner. Patient previously had right hemiarthoplasty and left hip CRPP. Patient denies any fever, chills, SOB, nausea, vomiting, diarrhea, numbness or tingling denies any other orthopedic concerns at this time.    PMH:  Hypertension    Anxiety    IBS (irritable bowel syndrome)    Migraine      No Known Allergies  ibuprofen (Stomach Upset)      PHYSICAL EXAM:  T(C): 36.8 (07-15-23 @ 07:21), Max: 37.3 (07-14-23 @ 21:56)  HR: 81 (07-15-23 @ 07:21) (69 - 89)  BP: 142/77 (07-15-23 @ 07:21) (126/74 - 171/81)  RR: 16 (07-15-23 @ 07:21) (16 - 16)  SpO2: 96% (07-15-23 @ 07:21) (94% - 99%)    Gen: NAD, Resting comfortably    Pelvis:  -TTP over pelvis    LLE:  Skin intact, no erythema or ecchymosis  TTP by hip/buttocks; no other pain throughout LLE  Able to SLR  No pain with log roll and axial loading  Motor: +EHL/FHL/TA/GSC  +SILT SPN/DPN/TA/Chandra/Saph  + DP  Compartments soft and compressible  No calf tenderness    LUE:  Skin in tact, no erythema or ecchymosis  Minimal TTP throughout left wrist and the rest of the LUE  No palpable step offs palpated by distal radius  Full AROM/PROM of wrist with minimal pain  Motor: AIN/PIN/Med/Uln/Radial Intact  + Radial Pulse      Secondary Assessment:  NC/AT, NTTP of clavicles, NTTP of C-,T-,L-Spine  RUE: Mild TTP by R knee. NTTP of Shoulder, Elbow, Wrist, Hand; NT with AROM/PROM of Shoulder, Elbow, Wrist, Hand; AIN/PIN/Med/Uln/Msc/Rad/Ax intact  LLE: Able to SLR, NT with Log Roll, NT with Heel Strike, Patient TTP over right knee, NTTP of Hips, Left Knee, Ankles, Feet; NT with AROM/PROM of Hips, Knees, Ankles, Feet; Q/H/Gsc/TA/EHL/FHL intact    Imaging:  CT pelvis: Acute mildly displaced left superior and inferior pubic ramus fractures. Acute nondisplaced left sacral wing fractures. Consistent with LC1 fracture.  Xray L wrist: Possible non-displaced distal fracture.  Xray R knee: No obvious fractures or dislocations       A/P:  72F with a who presents with a left LC1 fx and possible non-displaced left distal radius fracture    Given pt's benign clinical exam for L distal radius. pt can continue to wear left wrist brace for comfort.  Would recommend no weight bearing or lifting of the LUE   Pelvic fractures consistent with LC1 injury, which is usually treated conservatively  Analgesia  WBAT  DVT ppx per primary team  PT/OT  Ice and elevate as tolerated  No acute orthopedic surgical intervention indicated at this time  Orthopedically stable for DC if otherwise medically stable  Plan discussed with Dr. Dubois who agrees with plan above  Patient can follow up with Dr. Dubois if unable to follow up with Dr. Morris

## 2023-07-15 NOTE — H&P ADULT - PROBLEM SELECTOR PLAN 1
CT pelvis no cont - Acute mildly displaced left superior and inferior pubic ramus fractures. Acute nondisplaced left sacral wing fractures.  Given morphine x2, percocet for pain in ED  pain management: morphine 2mg for mod, 4mg for severe?  ortho consult CT pelvis no cont - Acute mildly displaced left superior and inferior pubic ramus fractures. Acute nondisplaced left sacral wing fractures.  Given morphine x2, percocet for pain in ED  pain management: oxy 2.5mg for mod, 5mg for severe  ortho consult s/p mechanical fall   CT pelvis no cont - Acute mildly displaced left superior and inferior pubic ramus fractures. Acute nondisplaced left sacral wing fractures.  Given morphine x2, percocet for pain in ED  pain management: oxy 2.5mg for mod, 5mg for severe  ortho consult  PT consult

## 2023-07-15 NOTE — H&P ADULT - NSHPSOCIALHISTORY_GEN_ALL_CORE
denies tobacco. denies alcohol use. denies recreational drug use.  lives at home w/ son. normally ambulates w/ walker.

## 2023-07-15 NOTE — H&P ADULT - ATTENDING COMMENTS
71 y/o F pmhx of anxiety, HTN, migraines, IBS, Hx of falls s/p R hip hemiarthroplasty  presented after a fall. Admitted for management of pelvic fracture.    Agree with above. Edited where appropriate

## 2023-07-15 NOTE — PATIENT PROFILE ADULT - FALL HARM RISK - HARM RISK INTERVENTIONS

## 2023-07-16 LAB
ANION GAP SERPL CALC-SCNC: 6 MMOL/L — SIGNIFICANT CHANGE UP (ref 5–17)
BUN SERPL-MCNC: 9 MG/DL — SIGNIFICANT CHANGE UP (ref 7–23)
CALCIUM SERPL-MCNC: 8.9 MG/DL — SIGNIFICANT CHANGE UP (ref 8.5–10.1)
CHLORIDE SERPL-SCNC: 102 MMOL/L — SIGNIFICANT CHANGE UP (ref 96–108)
CO2 SERPL-SCNC: 31 MMOL/L — SIGNIFICANT CHANGE UP (ref 22–31)
CREAT SERPL-MCNC: 0.74 MG/DL — SIGNIFICANT CHANGE UP (ref 0.5–1.3)
EGFR: 86 ML/MIN/1.73M2 — SIGNIFICANT CHANGE UP
GLUCOSE SERPL-MCNC: 155 MG/DL — HIGH (ref 70–99)
HCT VFR BLD CALC: 28.1 % — LOW (ref 34.5–45)
HGB BLD-MCNC: 8.4 G/DL — LOW (ref 11.5–15.5)
MCHC RBC-ENTMCNC: 27.9 PG — SIGNIFICANT CHANGE UP (ref 27–34)
MCHC RBC-ENTMCNC: 29.9 GM/DL — LOW (ref 32–36)
MCV RBC AUTO: 93.4 FL — SIGNIFICANT CHANGE UP (ref 80–100)
NRBC # BLD: 0 /100 WBCS — SIGNIFICANT CHANGE UP (ref 0–0)
PLATELET # BLD AUTO: 422 K/UL — HIGH (ref 150–400)
POTASSIUM SERPL-MCNC: 4.1 MMOL/L — SIGNIFICANT CHANGE UP (ref 3.5–5.3)
POTASSIUM SERPL-SCNC: 4.1 MMOL/L — SIGNIFICANT CHANGE UP (ref 3.5–5.3)
RBC # BLD: 3.01 M/UL — LOW (ref 3.8–5.2)
RBC # FLD: 13.8 % — SIGNIFICANT CHANGE UP (ref 10.3–14.5)
SODIUM SERPL-SCNC: 139 MMOL/L — SIGNIFICANT CHANGE UP (ref 135–145)
WBC # BLD: 10.92 K/UL — HIGH (ref 3.8–10.5)
WBC # FLD AUTO: 10.92 K/UL — HIGH (ref 3.8–10.5)

## 2023-07-16 RX ADMIN — OXYCODONE HYDROCHLORIDE 5 MILLIGRAM(S): 5 TABLET ORAL at 07:44

## 2023-07-16 RX ADMIN — LOSARTAN POTASSIUM 25 MILLIGRAM(S): 100 TABLET, FILM COATED ORAL at 05:45

## 2023-07-16 RX ADMIN — OXYCODONE HYDROCHLORIDE 5 MILLIGRAM(S): 5 TABLET ORAL at 14:39

## 2023-07-16 RX ADMIN — OXYCODONE HYDROCHLORIDE 5 MILLIGRAM(S): 5 TABLET ORAL at 13:39

## 2023-07-16 RX ADMIN — SENNA PLUS 2 TABLET(S): 8.6 TABLET ORAL at 22:14

## 2023-07-16 RX ADMIN — OXYCODONE HYDROCHLORIDE 5 MILLIGRAM(S): 5 TABLET ORAL at 08:44

## 2023-07-16 RX ADMIN — OXYCODONE HYDROCHLORIDE 5 MILLIGRAM(S): 5 TABLET ORAL at 20:05

## 2023-07-16 RX ADMIN — Medication 1000 UNIT(S): at 13:40

## 2023-07-16 NOTE — PHYSICAL THERAPY INITIAL EVALUATION ADULT - PERTINENT HX OF CURRENT PROBLEM, REHAB EVAL
Per EMR, "Patient is a 72F, RHD, ambulator with walker at baseline, with a PMH of HTN, IBS, migraines, and anxiety who presents to the ED s/p mechanical fall." Pt evaluated by orthopedics 7/15/23, PT orders updated to reflect WB status prior to eval.

## 2023-07-16 NOTE — SOCIAL WORK PROGRESS NOTE - NSSWPROGRESSNOTE_GEN_ALL_CORE
sw able to speak w pt at bedside. pt is open to [facility] if deemed needed (per PT anticipate MICHAEL) pt states that she would prefer [facility] as she was there before. Sw to watch for definitive recc from PT. Pt appears to be coping well w hospitalization. sw to continue to follow for plan.

## 2023-07-16 NOTE — PHYSICAL THERAPY INITIAL EVALUATION ADULT - BED MOBILITY TRAINING, PT EVAL
Patient will perform supine<>sit with contact guard assist to be able to reposition in bed and maintain skin integrity, within 3 to 5 sessions.

## 2023-07-16 NOTE — PROGRESS NOTE ADULT - ASSESSMENT
{\rtf1\vsxurv84759\ansi\lrhwhro7726\ftnbj\uc1\deff0  {\fonttbl{\f0 \fnil Segoe UI;}{\f1 \fnil \fcharset0 Segoe UI;}{\f2 \fnil Times New Donald;}}  {\colortbl ;\pit768\vnvpo604\dxez272 ;\red0\green0\blue0 ;\red0\green0\nqlf214 ;\red0\green0\blue0 ;}  {\stylesheet{\f0\fs20 Normal;}{\cs1 Default Paragraph Font;}{\cs2\f0\fs16 Line Number;}{\cs3\f2\fs24\ul\cf3 Hyperlink;}}  {\*\revtbl{Unknown;}}  \vscxmc90743\uxjdzh06662\ehcyl5660\dcmis2056\lxrkw0967\cqvjo8068\hzoadyp648\qrjxlks374\nogrowautofit\erctja173\formshade\nofeaturethrottle1\dntblnsbdb\fet4\aendnotes\aftnnrlc\pgbrdrhead\pgbrdrfoot  \sectd\bvxxlw82762\mulcls20969\guttersxn0\asnmnwew8685\wwlouzbx0110\oxkqkgct0730\nugucwhp4942\ndojmbi689\luvgdou427\sbkpage\pgncont\pgndec  \plain\plain\f0\fs24\ql\plain\f0\fs24{\*\bkmkstart [id number]}{\*\bkmkend [id number]}\plain\f0\fs20\pvgi2962\hich\f0\dbch\f0\loch\f0\fs20 73 y/o F pmhx of anxiety, HTN, migraines, IBS, Hx of falls s/p R hip hemiarthroplasty  presented after a   fall. Admitted for management of pelvic fracture.CT pelvis no cont - Acute mildly displaced left superior and inferior pubic ramus fractures. Acute nondisplaced left sacral wing fractures.\par  \par  #Pelvic fracture\par  pain management: oxy 2.5mg for mod, 5mg for severe\par  ortho consult\par  PT consult.\plain\f1\fs20\tqxu4982\hich\f1\dbch\f1\loch\f1\cf2\fs20\strike\plain\f0\fs20\hbkj5758\hich\f0\dbch\f0\loch\f0\fs20\par  #{\*\bkmkstart [id number]}{\*\bkmkend [id number]}Anemia. \par  \'b7  {\*\bkmkstart [id number]}{\*\bkmkend [id number]}Plan: {\*\bkmkstart [id number]}{\*\bkmkend [id number]}Hb 8.7 on admission (baseline ~9.5 per chart review)\par  Pt is asymptomatic, no active signs of bleeding.\par  \line #{\*\bkmkstart [id number]}{\*\bkmkend [id number]}HTN (hypertension). \par  continue home losartan\par  \par  #{\*\bkmkstart [id number]}{\*\bkmkend yf17593761694}DVT ppx - SCD, f/u ortho rec regarding pelvic fx\par  \par  \pard\plain\f0\fs24\plain\f0\fs20\kosk0367\hich\f0\dbch\f0\loch\f0\fs20 [employer] \par  \par  }

## 2023-07-16 NOTE — CARE COORDINATION ASSESSMENT. - NSCAREPROVIDERS_GEN_ALL_CORE_FT
CARE PROVIDERS:  Accepting Physician: Nora Hidalgo  Administration: Jonny Tarango  Admitting: Nora Hidalgo  Attending: Petrona Huerta  Consultant: Connor Cardenas  Consultant: Silver Retana  Consultant: Kady Telles  Covering Team: Gavin Brooks  Covering Team: Aneudy Smith  ED Attending: Bhargav Perez  ED Nurse: Kaleb Yu  Nurse: Jazz Felder  Nurse: Elvi Garcia  Ordered: Petrona Huerta  Ordered: ADM, User  PCA/Nursing Assistant: Audelia Trinidad  PCA/Nursing Assistant: Nolvia Maloney  Physical Therapy: Zuleika Mena  Physical Therapy: Latrell Dumont  Registered Dietitian: Tamera Hough  Respiratory Therapy: Leslie Valentin

## 2023-07-16 NOTE — PHYSICAL THERAPY INITIAL EVALUATION ADULT - TRANSFER TRAINING, PT EVAL
Patient will perform stand<>sit with minimal assist x1 to be able to get up to use the bathroom, within 3 to 5 sessions.

## 2023-07-16 NOTE — PHYSICAL THERAPY INITIAL EVALUATION ADULT - NSPTDISCHREC_GEN_A_CORE
anticipate MAGALIE pending progress in mobility during inpatient stay; Pt will require RW due to decrease strength and balance during transfers and ambulation (uses RW at baseline for mobility).

## 2023-07-16 NOTE — PHYSICAL THERAPY INITIAL EVALUATION ADULT - RANGE OF MOTION EXAMINATION, REHAB EVAL
L UE grossly limited < 25% AROM due to pain. R UE grossly WFL; B LE grossly limited <25% AROM due to pain at this time./deficits as listed below

## 2023-07-16 NOTE — PATIENT CHOICE NOTE. - NSPTCHOICESTATE_GEN_ALL_CORE

## 2023-07-16 NOTE — PHYSICAL THERAPY INITIAL EVALUATION ADULT - GENERAL OBSERVATIONS, REHAB EVAL
Patient chart reviewed, events noted. Cleared to be seen for PT eval by RN. Patient received semi-reclined in bed, NAD +Primafit +L wrist brace donned. Agreeable to PT at this time.

## 2023-07-16 NOTE — CARE COORDINATION ASSESSMENT. - NSDCPLANSERVICES_GEN_ALL_CORE
Patient wants to go to [facility]./Anticipated Needs Unclear at Present/Subacute Rehabilitation

## 2023-07-16 NOTE — CARE COORDINATION ASSESSMENT. - OTHER PERTINENT DISCHARGE PLANNING INFORMATION:
CM met with patient at bedside, introduced self and explained role of CM and transitional care planning. Patient verbalized understanding. Patient found to be A&Ox3, resides in private apartment with adult son. Has 2 steps to enter apartment, and was independent in ambulation with RW prior to admission. Patient expressed that upon DC, she would like to go to [facility]. Has been to [facility] in the past and would like to be referred when DC ready. Her PCP is Dr. Zuleika Palmer, however she has not seen her in about a year, as she has been following up with orthopedics in the past year due to previous hip surgeries. Per ortho, no surgical interventions at this time. Pending PT consult. CM will continue to follow.

## 2023-07-16 NOTE — PHYSICAL THERAPY INITIAL EVALUATION ADULT - GAIT TRAINING, PT EVAL
Patient will ambulate with minimal assist x1 for 50 feet with use of appropriate assistive device to be able to negotiate home environment, within 3 to 5 sessions.

## 2023-07-16 NOTE — PHYSICAL THERAPY INITIAL EVALUATION ADULT - IMPAIRMENTS CONTRIBUTING IMPAIRED BED MOBILITY, REHAB EVAL
pt attempted scooting up in bed with R UE however was not able due to significant pain; pt deferred further mobility at this time, RN made aware of pt pain levels/pain

## 2023-07-16 NOTE — CARE COORDINATION ASSESSMENT. - NSPASTMEDSURGHISTORY_GEN_ALL_CORE_FT
PAST MEDICAL & SURGICAL HISTORY:  Migraine      IBS (irritable bowel syndrome)      Anxiety      Hypertension      S/P tonsillectomy      History of arthroplasty of right hip

## 2023-07-16 NOTE — PROGRESS NOTE ADULT - SUBJECTIVE AND OBJECTIVE BOX
Patient is a 72y old  Female who presents with a chief complaint of pelvic fracture (15 Jul 2023 08:45)      INTERVAL HPI/OVERNIGHT EVENTS: noted  pt seen and examined this am   events noted      Vital Signs Last 24 Hrs  T(C): 37.1 (16 Jul 2023 12:17), Max: 37.3 (16 Jul 2023 05:05)  T(F): 98.7 (16 Jul 2023 12:17), Max: 99.1 (16 Jul 2023 05:05)  HR: 87 (16 Jul 2023 12:17) (87 - 99)  BP: 115/71 (16 Jul 2023 12:17) (115/71 - 155/77)  BP(mean): --  RR: 18 (16 Jul 2023 12:17) (17 - 19)  SpO2: 95% (16 Jul 2023 12:17) (93% - 95%)    Parameters below as of 16 Jul 2023 12:17  Patient On (Oxygen Delivery Method): room air        acetaminophen     Tablet .. 650 milliGRAM(s) Oral every 6 hours PRN  aluminum hydroxide/magnesium hydroxide/simethicone Suspension 30 milliLiter(s) Oral every 4 hours PRN  artificial tears (preservative free) Ophthalmic Solution 2 Drop(s) Both EYES every 6 hours PRN  bisacodyl 5 milliGRAM(s) Oral daily PRN  cholecalciferol 1000 Unit(s) Oral daily  losartan 25 milliGRAM(s) Oral daily  melatonin 3 milliGRAM(s) Oral at bedtime PRN  naloxone Injectable 0.4 milliGRAM(s) IV Push once  ondansetron Injectable 4 milliGRAM(s) IV Push every 8 hours PRN  oxyCODONE    IR 2.5 milliGRAM(s) Oral every 4 hours PRN  oxyCODONE    IR 5 milliGRAM(s) Oral every 4 hours PRN  polyethylene glycol 3350 17 Gram(s) Oral daily  senna 2 Tablet(s) Oral at bedtime      PHYSICAL EXAM:  GENERAL: NAD,   EYES: conjunctiva and sclera clear  ENMT: Moist mucous membranes  NECK: Supple, No JVD, Normal thyroid  CHEST/LUNG: non labored, cta b/l  HEART: Regular rate and rhythm; No murmurs, rubs, or gallops  ABDOMEN: Soft, Nontender, Nondistended; Bowel sounds present  EXTREMITIES:  2+ Peripheral Pulses, No clubbing, cyanosis, or edema  LYMPH: No lymphadenopathy noted  SKIN: No rashes or lesions    Consultant(s) Notes Reviewed:  [x ] YES  [ ] NO  Care Discussed with Consultants/Other Providers [ x] YES  [ ] NO    LABS:                        8.4    10.92 )-----------( 422      ( 16 Jul 2023 09:15 )             28.1     07-16    139  |  102  |  9   ----------------------------<  155<H>  4.1   |  31  |  0.74    Ca    8.9      16 Jul 2023 09:15    TPro  6.8  /  Alb  2.7<L>  /  TBili  0.4  /  DBili  x   /  AST  19  /  ALT  12  /  AlkPhos  76  07-15    PT/INR - ( 15 Jul 2023 05:37 )   PT: 14.3 sec;   INR: 1.22 ratio         PTT - ( 15 Jul 2023 05:37 )  PTT:32.8 sec  Urinalysis Basic - ( 16 Jul 2023 09:15 )    Color: x / Appearance: x / SG: x / pH: x  Gluc: 155 mg/dL / Ketone: x  / Bili: x / Urobili: x   Blood: x / Protein: x / Nitrite: x   Leuk Esterase: x / RBC: x / WBC x   Sq Epi: x / Non Sq Epi: x / Bacteria: x      CAPILLARY BLOOD GLUCOSE            Urinalysis Basic - ( 16 Jul 2023 09:15 )    Color: x / Appearance: x / SG: x / pH: x  Gluc: 155 mg/dL / Ketone: x  / Bili: x / Urobili: x   Blood: x / Protein: x / Nitrite: x   Leuk Esterase: x / RBC: x / WBC x   Sq Epi: x / Non Sq Epi: x / Bacteria: x          RADIOLOGY & ADDITIONAL TESTS:    Imaging Personally Reviewed:  [x ] YES  [ ] NO

## 2023-07-16 NOTE — PHYSICAL THERAPY INITIAL EVALUATION ADULT - LIVES WITH, PROFILE
Pt states she lives in a coop apartment; per EMR pt with 2 steps to enter residence, lives with adult son. Pt uses a rolling walker at baseline for ambulation. Pt is a retired nurse.

## 2023-07-16 NOTE — PHYSICAL THERAPY INITIAL EVALUATION ADULT - MANUAL MUSCLE TESTING RESULTS, REHAB EVAL
fracture/pain limiting MMT; R UE grossly 3+/5 via functional assessment; L UE and B LE grossly < or = 3-/5 via functional assessment. No resistance applied t/o./grossly assessed due to

## 2023-07-17 ENCOUNTER — TRANSCRIPTION ENCOUNTER (OUTPATIENT)
Age: 73
End: 2023-07-17

## 2023-07-17 LAB
ANION GAP SERPL CALC-SCNC: 4 MMOL/L — LOW (ref 5–17)
BUN SERPL-MCNC: 12 MG/DL — SIGNIFICANT CHANGE UP (ref 7–23)
CALCIUM SERPL-MCNC: 8.9 MG/DL — SIGNIFICANT CHANGE UP (ref 8.5–10.1)
CHLORIDE SERPL-SCNC: 101 MMOL/L — SIGNIFICANT CHANGE UP (ref 96–108)
CO2 SERPL-SCNC: 31 MMOL/L — SIGNIFICANT CHANGE UP (ref 22–31)
CREAT SERPL-MCNC: 0.75 MG/DL — SIGNIFICANT CHANGE UP (ref 0.5–1.3)
EGFR: 85 ML/MIN/1.73M2 — SIGNIFICANT CHANGE UP
GLUCOSE SERPL-MCNC: 101 MG/DL — HIGH (ref 70–99)
HCT VFR BLD CALC: 29.5 % — LOW (ref 34.5–45)
HGB BLD-MCNC: 8.9 G/DL — LOW (ref 11.5–15.5)
MCHC RBC-ENTMCNC: 27.9 PG — SIGNIFICANT CHANGE UP (ref 27–34)
MCHC RBC-ENTMCNC: 30.2 GM/DL — LOW (ref 32–36)
MCV RBC AUTO: 92.5 FL — SIGNIFICANT CHANGE UP (ref 80–100)
NRBC # BLD: 0 /100 WBCS — SIGNIFICANT CHANGE UP (ref 0–0)
PLATELET # BLD AUTO: 436 K/UL — HIGH (ref 150–400)
POTASSIUM SERPL-MCNC: 4.7 MMOL/L — SIGNIFICANT CHANGE UP (ref 3.5–5.3)
POTASSIUM SERPL-SCNC: 4.7 MMOL/L — SIGNIFICANT CHANGE UP (ref 3.5–5.3)
RBC # BLD: 3.19 M/UL — LOW (ref 3.8–5.2)
RBC # FLD: 13.5 % — SIGNIFICANT CHANGE UP (ref 10.3–14.5)
SODIUM SERPL-SCNC: 136 MMOL/L — SIGNIFICANT CHANGE UP (ref 135–145)
WBC # BLD: 11.46 K/UL — HIGH (ref 3.8–10.5)
WBC # FLD AUTO: 11.46 K/UL — HIGH (ref 3.8–10.5)

## 2023-07-17 RX ORDER — ACETAMINOPHEN 500 MG
2 TABLET ORAL
Qty: 0 | Refills: 0 | DISCHARGE
Start: 2023-07-17

## 2023-07-17 RX ORDER — LANOLIN ALCOHOL/MO/W.PET/CERES
1 CREAM (GRAM) TOPICAL
Qty: 0 | Refills: 0 | DISCHARGE
Start: 2023-07-17

## 2023-07-17 RX ORDER — ENOXAPARIN SODIUM 100 MG/ML
40 INJECTION SUBCUTANEOUS EVERY 24 HOURS
Refills: 0 | Status: DISCONTINUED | OUTPATIENT
Start: 2023-07-17 | End: 2023-07-18

## 2023-07-17 RX ORDER — ENOXAPARIN SODIUM 100 MG/ML
40 INJECTION SUBCUTANEOUS
Qty: 0 | Refills: 0 | DISCHARGE
Start: 2023-07-17 | End: 2023-08-17

## 2023-07-17 RX ORDER — OXYCODONE HYDROCHLORIDE 5 MG/1
1 TABLET ORAL
Qty: 0 | Refills: 0 | DISCHARGE
Start: 2023-07-17

## 2023-07-17 RX ORDER — SENNA PLUS 8.6 MG/1
2 TABLET ORAL
Qty: 0 | Refills: 0 | DISCHARGE
Start: 2023-07-17

## 2023-07-17 RX ADMIN — ENOXAPARIN SODIUM 40 MILLIGRAM(S): 100 INJECTION SUBCUTANEOUS at 11:43

## 2023-07-17 RX ADMIN — OXYCODONE HYDROCHLORIDE 5 MILLIGRAM(S): 5 TABLET ORAL at 00:23

## 2023-07-17 RX ADMIN — OXYCODONE HYDROCHLORIDE 5 MILLIGRAM(S): 5 TABLET ORAL at 09:02

## 2023-07-17 RX ADMIN — LOSARTAN POTASSIUM 25 MILLIGRAM(S): 100 TABLET, FILM COATED ORAL at 05:43

## 2023-07-17 RX ADMIN — Medication 2 DROP(S): at 11:40

## 2023-07-17 RX ADMIN — OXYCODONE HYDROCHLORIDE 2.5 MILLIGRAM(S): 5 TABLET ORAL at 12:10

## 2023-07-17 RX ADMIN — OXYCODONE HYDROCHLORIDE 5 MILLIGRAM(S): 5 TABLET ORAL at 01:23

## 2023-07-17 RX ADMIN — OXYCODONE HYDROCHLORIDE 5 MILLIGRAM(S): 5 TABLET ORAL at 09:32

## 2023-07-17 RX ADMIN — OXYCODONE HYDROCHLORIDE 5 MILLIGRAM(S): 5 TABLET ORAL at 20:09

## 2023-07-17 RX ADMIN — OXYCODONE HYDROCHLORIDE 2.5 MILLIGRAM(S): 5 TABLET ORAL at 11:40

## 2023-07-17 RX ADMIN — POLYETHYLENE GLYCOL 3350 17 GRAM(S): 17 POWDER, FOR SOLUTION ORAL at 11:40

## 2023-07-17 RX ADMIN — OXYCODONE HYDROCHLORIDE 5 MILLIGRAM(S): 5 TABLET ORAL at 21:09

## 2023-07-17 RX ADMIN — Medication 1000 UNIT(S): at 11:40

## 2023-07-17 NOTE — DISCHARGE NOTE PROVIDER - PROVIDER TOKENS
PROVIDER:[TOKEN:[[id number]],FOLLOWUP:[2 weeks]],PROVIDER:[TOKEN:[[id number]],FOLLOWUP:[2 weeks]]

## 2023-07-17 NOTE — DISCHARGE NOTE PROVIDER - CARE PROVIDER_API CALL
Red Morris  Orthopaedic Surgery  [address] [place] [address]  Phone: [phone]  Fax: [phone]  Follow Up Time: 2 weeks    Zuleika Palmer  [facility]  [address] [place] [address], [address] [place] [address]  Phone: [phone]  Fax: [phone]  Follow Up Time: 2 weeks

## 2023-07-17 NOTE — DISCHARGE NOTE PROVIDER - NSDCCPCAREPLAN_GEN_ALL_CORE_FT
PRINCIPAL DISCHARGE DIAGNOSIS  Diagnosis: Pelvic fracture  Assessment and Plan of Treatment: pain control, dvt ppx, PT      SECONDARY DISCHARGE DIAGNOSES  Diagnosis: Inability to walk  Assessment and Plan of Treatment:

## 2023-07-17 NOTE — DISCHARGE NOTE PROVIDER - NSDCMRMEDTOKEN_GEN_ALL_CORE_FT
acetaminophen 325 mg oral tablet: 2 tab(s) orally every 6 hours As needed Temp greater or equal to 38C (100.4F), Mild Pain (1 - 3)  bisacodyl 5 mg oral delayed release tablet: 1 tab(s) orally once a day As needed Constipation  enoxaparin: 40 milligram(s) subcutaneous once a day  losartan 25 mg oral tablet: 1 tab(s) orally 2 times a day  melatonin 3 mg oral tablet: 1 tab(s) orally once a day (at bedtime) As needed Insomnia  ocular lubricant ophthalmic solution: 2 drop(s) to each affected eye every 6 hours As needed Dry Eyes  oxyCODONE 5 mg oral tablet: 1 tab(s) orally every 4 hours As needed Severe Pain (7 - 10)  senna leaf extract oral tablet: 2 tab(s) orally once a day (at bedtime)  Vitamin D3 25 mcg (1000 intl units) oral tablet: 1 orally once a day

## 2023-07-17 NOTE — DISCHARGE NOTE PROVIDER - HOSPITAL COURSE
71 y/o F pmhx of anxiety, HTN, migraines, IBS, Hx of falls s/p R hip hemiarthroplasty  presented after a fall. Admitted for management of pelvic fracture. CT pelvis - Acute mildly displaced left superior and inferior pubic ramus fractures. Acute nondisplaced left sacral wing fractures.    #Pelvic fracture- oxy 2.5mg for mod, 5mg for severe  #Anemia- stable hb at baseline  Pt is asymptomatic, no active signs of bleeding.  #HTN (hypertension). continue home losartan  start DVT ppx- lovenox 40 sq qd

## 2023-07-18 ENCOUNTER — TRANSCRIPTION ENCOUNTER (OUTPATIENT)
Age: 73
End: 2023-07-18

## 2023-07-18 VITALS
DIASTOLIC BLOOD PRESSURE: 71 MMHG | OXYGEN SATURATION: 93 % | HEART RATE: 86 BPM | RESPIRATION RATE: 18 BRPM | TEMPERATURE: 98 F | SYSTOLIC BLOOD PRESSURE: 145 MMHG

## 2023-07-18 PROCEDURE — 73110 X-RAY EXAM OF WRIST: CPT

## 2023-07-18 PROCEDURE — 80048 BASIC METABOLIC PNL TOTAL CA: CPT

## 2023-07-18 PROCEDURE — 85025 COMPLETE CBC W/AUTO DIFF WBC: CPT

## 2023-07-18 PROCEDURE — 36415 COLL VENOUS BLD VENIPUNCTURE: CPT

## 2023-07-18 PROCEDURE — 85610 PROTHROMBIN TIME: CPT

## 2023-07-18 PROCEDURE — 99285 EMERGENCY DEPT VISIT HI MDM: CPT | Mod: 25

## 2023-07-18 PROCEDURE — 72192 CT PELVIS W/O DYE: CPT | Mod: MA

## 2023-07-18 PROCEDURE — 97530 THERAPEUTIC ACTIVITIES: CPT

## 2023-07-18 PROCEDURE — 72170 X-RAY EXAM OF PELVIS: CPT

## 2023-07-18 PROCEDURE — 80053 COMPREHEN METABOLIC PANEL: CPT

## 2023-07-18 PROCEDURE — 71045 X-RAY EXAM CHEST 1 VIEW: CPT

## 2023-07-18 PROCEDURE — 96376 TX/PRO/DX INJ SAME DRUG ADON: CPT

## 2023-07-18 PROCEDURE — 85730 THROMBOPLASTIN TIME PARTIAL: CPT

## 2023-07-18 PROCEDURE — 73610 X-RAY EXAM OF ANKLE: CPT

## 2023-07-18 PROCEDURE — 73562 X-RAY EXAM OF KNEE 3: CPT

## 2023-07-18 PROCEDURE — 85027 COMPLETE CBC AUTOMATED: CPT

## 2023-07-18 PROCEDURE — 83036 HEMOGLOBIN GLYCOSYLATED A1C: CPT

## 2023-07-18 PROCEDURE — 93971 EXTREMITY STUDY: CPT

## 2023-07-18 PROCEDURE — 80061 LIPID PANEL: CPT

## 2023-07-18 PROCEDURE — 73522 X-RAY EXAM HIPS BI 3-4 VIEWS: CPT

## 2023-07-18 PROCEDURE — 97162 PT EVAL MOD COMPLEX 30 MIN: CPT

## 2023-07-18 PROCEDURE — 96375 TX/PRO/DX INJ NEW DRUG ADDON: CPT

## 2023-07-18 PROCEDURE — 96374 THER/PROPH/DIAG INJ IV PUSH: CPT

## 2023-07-18 PROCEDURE — 93005 ELECTROCARDIOGRAM TRACING: CPT

## 2023-07-18 PROCEDURE — 73130 X-RAY EXAM OF HAND: CPT

## 2023-07-18 RX ADMIN — OXYCODONE HYDROCHLORIDE 5 MILLIGRAM(S): 5 TABLET ORAL at 05:57

## 2023-07-18 RX ADMIN — OXYCODONE HYDROCHLORIDE 5 MILLIGRAM(S): 5 TABLET ORAL at 01:22

## 2023-07-18 RX ADMIN — Medication 1000 UNIT(S): at 10:28

## 2023-07-18 RX ADMIN — OXYCODONE HYDROCHLORIDE 5 MILLIGRAM(S): 5 TABLET ORAL at 10:28

## 2023-07-18 RX ADMIN — OXYCODONE HYDROCHLORIDE 5 MILLIGRAM(S): 5 TABLET ORAL at 10:58

## 2023-07-18 RX ADMIN — POLYETHYLENE GLYCOL 3350 17 GRAM(S): 17 POWDER, FOR SOLUTION ORAL at 10:26

## 2023-07-18 RX ADMIN — OXYCODONE HYDROCHLORIDE 2.5 MILLIGRAM(S): 5 TABLET ORAL at 09:20

## 2023-07-18 RX ADMIN — ENOXAPARIN SODIUM 40 MILLIGRAM(S): 100 INJECTION SUBCUTANEOUS at 10:28

## 2023-07-18 RX ADMIN — OXYCODONE HYDROCHLORIDE 2.5 MILLIGRAM(S): 5 TABLET ORAL at 08:50

## 2023-07-18 RX ADMIN — LOSARTAN POTASSIUM 25 MILLIGRAM(S): 100 TABLET, FILM COATED ORAL at 05:55

## 2023-07-18 RX ADMIN — OXYCODONE HYDROCHLORIDE 5 MILLIGRAM(S): 5 TABLET ORAL at 00:22

## 2023-07-18 RX ADMIN — OXYCODONE HYDROCHLORIDE 5 MILLIGRAM(S): 5 TABLET ORAL at 06:57

## 2023-07-18 RX ADMIN — OXYCODONE HYDROCHLORIDE 5 MILLIGRAM(S): 5 TABLET ORAL at 16:55

## 2023-07-18 RX ADMIN — Medication 2 DROP(S): at 10:28

## 2023-07-18 NOTE — DISCHARGE NOTE NURSING/CASE MANAGEMENT/SOCIAL WORK - PATIENT PORTAL LINK FT
You can access the FollowMyHealth Patient Portal offered by [facility] by registering at the following website: [email or web address]. By joining [facility]’s FollowMyHealth portal, you will also be able to view your health information using other applications (apps) compatible with our system.

## 2023-07-18 NOTE — SOCIAL WORK PROGRESS NOTE - NSSWPROGRESSNOTE_GEN_ALL_CORE
pt for dc to [facility] for michael today. Pt in agreement, pt to notify son. Ambulance called thr nwems for 3:30 . All paperwork to accompany patient. no further sw services indicated.

## 2023-07-18 NOTE — DISCHARGE NOTE NURSING/CASE MANAGEMENT/SOCIAL WORK - NSDCPEFALRISK_GEN_ALL_CORE
For information on Fall & Injury Prevention, visit: [email or web address] OR  [email or web address] OR  https://www.cdc.gov/steadi/patient.html

## 2024-03-11 NOTE — ED ADULT NURSE NOTE - NSIMPLEMENTINTERV_GEN_ALL_ED
Patient called at [phone].  Name and  used as positive patient identifiers.  Made aware of test results.  Will complete diflucan.  
Implemented All Universal Safety Interventions:  [place] to call system. Call bell, personal items and telephone within reach. Instruct patient to call for assistance. Room bathroom lighting operational. Non-slip footwear when patient is off stretcher. Physically safe environment: no spills, clutter or unnecessary equipment. Stretcher in lowest position, wheels locked, appropriate side rails in place.

## 2025-01-24 NOTE — PATIENT PROFILE ADULT - NSPROGENBLOODRESTRICT_GEN_A_NUR
Medication to be refilled: amphetamine-dextroamphetamine (Adderall) 30 MG tablet   Sig - Route: Take 0.5 tablets by mouth daily. Begin taking on December 30, 2024. - Oral     Last Refill:12/30/2024  #15 x0 refills       Medication to be refilled: amphetamine-dextroamphetamine (Adderall XR) 30 MG 24 hr capsule   Sig - Route: Take 1 capsule by mouth daily. Begin taking on December 30, 2024. - Oral     Last Refill:12/30/2024  #30 x0 refills     Last Office Visit:10/15/2024  Office Visit Scheduled: 2/18/2025    Controlled substance agreement- 7/15/2024  Drug screen: 2/15/2024- added to upcoming appt note     PER PDMP LAST SOLD: both last sold 12/30/2024. Earliest refill 1/29/2025   none

## 2025-01-27 NOTE — H&P ADULT - GENERAL
Please get CT scan of chest in July 2025.  Continue on Flonase nasal spray twice a day as needed.  Continue to take Loratadine 10 mg daily as needed for sinus drainage.  Call with any questions or concerns.  Please use CPAP nightly and bring your CPAP with you to next appt.   Follow up with Dr. Rojas in 6 months.  
negative

## 2025-06-25 NOTE — ED ADULT NURSE NOTE - NSFALLOOBATTEMPT_ED_ALL_ED
No General Sunscreen Counseling: I recommended a broad spectrum sunscreen with a SPF of 30 or higher.  I explained that SPF 30 sunscreens block approximately 97 percent of the sun's harmful rays.  Sunscreens should be applied at least 15 minutes prior to expected sun exposure and then every 2 hours after that as long as sun exposure continues. If swimming or exercising sunscreen should be reapplied every 45 minutes to an hour after getting wet or sweating.  One ounce, or the equivalent of a shot glass full of sunscreen, is adequate to protect the skin not covered by a bathing suit. I also recommended a lip balm with a sunscreen as well. Sun protective clothing can be used in lieu of sunscreen but must be worn the entire time you are exposed to the sun's rays. Detail Level: Detailed Products Recommended: Zinc, mineral, or titanium SPF 30-50